# Patient Record
Sex: FEMALE | Race: WHITE | NOT HISPANIC OR LATINO | Employment: OTHER | URBAN - METROPOLITAN AREA
[De-identification: names, ages, dates, MRNs, and addresses within clinical notes are randomized per-mention and may not be internally consistent; named-entity substitution may affect disease eponyms.]

---

## 2023-04-08 ENCOUNTER — APPOINTMENT (OUTPATIENT)
Dept: RADIOLOGY | Facility: MEDICAL CENTER | Age: 88
DRG: 087 | End: 2023-04-08
Attending: EMERGENCY MEDICINE
Payer: MEDICARE

## 2023-04-08 ENCOUNTER — APPOINTMENT (OUTPATIENT)
Dept: RADIOLOGY | Facility: MEDICAL CENTER | Age: 88
DRG: 087 | End: 2023-04-08
Attending: SURGERY
Payer: MEDICARE

## 2023-04-08 ENCOUNTER — HOSPITAL ENCOUNTER (INPATIENT)
Facility: MEDICAL CENTER | Age: 88
LOS: 3 days | DRG: 087 | End: 2023-04-11
Attending: EMERGENCY MEDICINE | Admitting: SURGERY
Payer: MEDICARE

## 2023-04-08 ENCOUNTER — HOSPITAL ENCOUNTER (OUTPATIENT)
Dept: RADIOLOGY | Facility: MEDICAL CENTER | Age: 88
End: 2023-04-08

## 2023-04-08 DIAGNOSIS — R41.82 ALTERED MENTAL STATUS, UNSPECIFIED ALTERED MENTAL STATUS TYPE: ICD-10-CM

## 2023-04-08 DIAGNOSIS — S01.01XA LACERATION OF SCALP, INITIAL ENCOUNTER: ICD-10-CM
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
## 2023-04-08 DIAGNOSIS — S06.5XAA SDH (SUBDURAL HEMATOMA) (HCC): ICD-10-CM

## 2023-04-08 PROBLEM — S02.119A FRACTURE OF OCCIPITAL BONE (HCC): Status: ACTIVE | Noted: 2023-04-08

## 2023-04-08 PROBLEM — Z86.73 HISTORY OF EMBOLIC STROKE: Status: ACTIVE | Noted: 2023-04-08

## 2023-04-08 PROBLEM — I10 PRIMARY HYPERTENSION: Status: ACTIVE | Noted: 2023-04-08

## 2023-04-08 PROBLEM — Z53.09 CONTRAINDICATION TO ANTICOAGULATION THERAPY: Status: ACTIVE | Noted: 2023-04-08

## 2023-04-08 PROBLEM — S01.91XA LACERATION OF HEAD, INITIAL ENCOUNTER: Status: ACTIVE | Noted: 2023-04-08

## 2023-04-08 PROBLEM — T14.90XA TRAUMA: Status: ACTIVE | Noted: 2023-04-08

## 2023-04-08 PROBLEM — S02.119B: Status: ACTIVE | Noted: 2023-04-08

## 2023-04-08 PROBLEM — M19.90 OSTEOARTHRITIS: Status: ACTIVE | Noted: 2023-04-08

## 2023-04-08 LAB
ABO + RH BLD: NORMAL
ABO GROUP BLD: NORMAL
ALBUMIN SERPL BCP-MCNC: 3.7 G/DL (ref 3.2–4.9)
ALBUMIN/GLOB SERPL: 1.3 G/DL
ALP SERPL-CCNC: 110 U/L (ref 30–99)
ALT SERPL-CCNC: 7 U/L (ref 2–50)
ANION GAP SERPL CALC-SCNC: 11 MMOL/L (ref 7–16)
APPEARANCE UR: CLEAR
APTT PPP: 29.2 SEC (ref 24.7–36)
AST SERPL-CCNC: 22 U/L (ref 12–45)
BARCODED ABORH UBTYP: 7300
BARCODED PRD CODE UBPRD: NORMAL
BARCODED UNIT NUM UBUNT: NORMAL
BILIRUB SERPL-MCNC: 0.4 MG/DL (ref 0.1–1.5)
BILIRUB UR QL STRIP.AUTO: NEGATIVE
BLD GP AB SCN SERPL QL: NORMAL
BUN SERPL-MCNC: 14 MG/DL (ref 8–22)
CALCIUM ALBUM COR SERPL-MCNC: 8.6 MG/DL (ref 8.5–10.5)
CALCIUM SERPL-MCNC: 8.4 MG/DL (ref 8.5–10.5)
CFT BLD TEG: 5.3 MIN (ref 4.6–9.1)
CFT BLD TEG: 5.9 MIN (ref 4.6–9.1)
CFT P HPASE BLD TEG: 5 MIN (ref 4.3–8.3)
CFT P HPASE BLD TEG: 5.6 MIN (ref 4.3–8.3)
CHLORIDE SERPL-SCNC: 109 MMOL/L (ref 96–112)
CLOT ANGLE BLD TEG: 74.5 DEGREES (ref 63–78)
CLOT ANGLE BLD TEG: 76.9 DEGREES (ref 63–78)
CLOT LYSIS 30M P MA LENFR BLD TEG: 0.7 % (ref 0–2.6)
CLOT LYSIS 30M P MA LENFR BLD TEG: 1 % (ref 0–2.6)
CO2 SERPL-SCNC: 23 MMOL/L (ref 20–33)
COLOR UR: YELLOW
COMPONENT P 8504P: NORMAL
CREAT SERPL-MCNC: 0.77 MG/DL (ref 0.5–1.4)
CT.EXTRINSIC BLD ROTEM: 1 MIN (ref 0.8–2.1)
CT.EXTRINSIC BLD ROTEM: 1.1 MIN (ref 0.8–2.1)
ERYTHROCYTE [DISTWIDTH] IN BLOOD BY AUTOMATED COUNT: 48.2 FL (ref 35.9–50)
ETHANOL BLD-MCNC: <10.1 MG/DL
GFR SERPLBLD CREATININE-BSD FMLA CKD-EPI: 75 ML/MIN/1.73 M 2
GLOBULIN SER CALC-MCNC: 2.8 G/DL (ref 1.9–3.5)
GLUCOSE SERPL-MCNC: 100 MG/DL (ref 65–99)
GLUCOSE UR STRIP.AUTO-MCNC: NEGATIVE MG/DL
HCT VFR BLD AUTO: 37.9 % (ref 37–47)
HGB BLD-MCNC: 12.1 G/DL (ref 12–16)
INR PPP: 1.06 (ref 0.87–1.13)
KETONES UR STRIP.AUTO-MCNC: NEGATIVE MG/DL
LEUKOCYTE ESTERASE UR QL STRIP.AUTO: NEGATIVE
MCF BLD TEG: 63.1 MM (ref 52–69)
MCF BLD TEG: 63.9 MM (ref 52–69)
MCF.PLATELET INHIB BLD ROTEM: 21.1 MM (ref 15–32)
MCF.PLATELET INHIB BLD ROTEM: 24.4 MM (ref 15–32)
MCH RBC QN AUTO: 28.3 PG (ref 27–33)
MCHC RBC AUTO-ENTMCNC: 31.9 G/DL (ref 33.6–35)
MCV RBC AUTO: 88.6 FL (ref 81.4–97.8)
MICRO URNS: NORMAL
NITRITE UR QL STRIP.AUTO: NEGATIVE
PA AA BLD-ACNC: 20.4 % (ref 0–11)
PA AA BLD-ACNC: 86.2 % (ref 0–11)
PA ADP BLD-ACNC: 15.7 % (ref 0–17)
PA ADP BLD-ACNC: 16.4 % (ref 0–17)
PH UR STRIP.AUTO: 7 [PH] (ref 5–8)
PLATELET # BLD AUTO: 242 K/UL (ref 164–446)
PMV BLD AUTO: 10.9 FL (ref 9–12.9)
POTASSIUM SERPL-SCNC: 4.5 MMOL/L (ref 3.6–5.5)
PRODUCT TYPE UPROD: NORMAL
PROT SERPL-MCNC: 6.5 G/DL (ref 6–8.2)
PROT UR QL STRIP: NEGATIVE MG/DL
PROTHROMBIN TIME: 13.7 SEC (ref 12–14.6)
RBC # BLD AUTO: 4.28 M/UL (ref 4.2–5.4)
RBC UR QL AUTO: NEGATIVE
RH BLD: NORMAL
SODIUM SERPL-SCNC: 143 MMOL/L (ref 135–145)
SP GR UR STRIP.AUTO: 1.01
TEG ALGORITHM TGALG: ABNORMAL
TEG ALGORITHM TGALG: ABNORMAL
UNIT STATUS USTAT: NORMAL
UROBILINOGEN UR STRIP.AUTO-MCNC: 0.2 MG/DL
WBC # BLD AUTO: 8.5 K/UL (ref 4.8–10.8)

## 2023-04-08 PROCEDURE — 36415 COLL VENOUS BLD VENIPUNCTURE: CPT

## 2023-04-08 PROCEDURE — 90715 TDAP VACCINE 7 YRS/> IM: CPT | Performed by: EMERGENCY MEDICINE

## 2023-04-08 PROCEDURE — 51701 INSERT BLADDER CATHETER: CPT

## 2023-04-08 PROCEDURE — 71045 X-RAY EXAM CHEST 1 VIEW: CPT

## 2023-04-08 PROCEDURE — 85576 BLOOD PLATELET AGGREGATION: CPT | Mod: 91

## 2023-04-08 PROCEDURE — 81003 URINALYSIS AUTO W/O SCOPE: CPT

## 2023-04-08 PROCEDURE — 99291 CRITICAL CARE FIRST HOUR: CPT

## 2023-04-08 PROCEDURE — 700111 HCHG RX REV CODE 636 W/ 250 OVERRIDE (IP): Performed by: SURGERY

## 2023-04-08 PROCEDURE — G0390 TRAUMA RESPONS W/HOSP CRITI: HCPCS

## 2023-04-08 PROCEDURE — A9270 NON-COVERED ITEM OR SERVICE: HCPCS | Performed by: SURGERY

## 2023-04-08 PROCEDURE — 85347 COAGULATION TIME ACTIVATED: CPT

## 2023-04-08 PROCEDURE — P9034 PLATELETS, PHERESIS: HCPCS

## 2023-04-08 PROCEDURE — 700111 HCHG RX REV CODE 636 W/ 250 OVERRIDE (IP): Performed by: EMERGENCY MEDICINE

## 2023-04-08 PROCEDURE — 70450 CT HEAD/BRAIN W/O DYE: CPT

## 2023-04-08 PROCEDURE — 82077 ASSAY SPEC XCP UR&BREATH IA: CPT

## 2023-04-08 PROCEDURE — 85730 THROMBOPLASTIN TIME PARTIAL: CPT

## 2023-04-08 PROCEDURE — 3E0234Z INTRODUCTION OF SERUM, TOXOID AND VACCINE INTO MUSCLE, PERCUTANEOUS APPROACH: ICD-10-PCS | Performed by: EMERGENCY MEDICINE

## 2023-04-08 PROCEDURE — 36430 TRANSFUSION BLD/BLD COMPNT: CPT

## 2023-04-08 PROCEDURE — 85027 COMPLETE CBC AUTOMATED: CPT

## 2023-04-08 PROCEDURE — 72170 X-RAY EXAM OF PELVIS: CPT

## 2023-04-08 PROCEDURE — 86900 BLOOD TYPING SEROLOGIC ABO: CPT

## 2023-04-08 PROCEDURE — 770022 HCHG ROOM/CARE - ICU (200)

## 2023-04-08 PROCEDURE — 90471 IMMUNIZATION ADMIN: CPT

## 2023-04-08 PROCEDURE — 86850 RBC ANTIBODY SCREEN: CPT

## 2023-04-08 PROCEDURE — 700102 HCHG RX REV CODE 250 W/ 637 OVERRIDE(OP): Performed by: SURGERY

## 2023-04-08 PROCEDURE — 85384 FIBRINOGEN ACTIVITY: CPT

## 2023-04-08 PROCEDURE — 80053 COMPREHEN METABOLIC PANEL: CPT

## 2023-04-08 PROCEDURE — 30233R1 TRANSFUSION OF NONAUTOLOGOUS PLATELETS INTO PERIPHERAL VEIN, PERCUTANEOUS APPROACH: ICD-10-PCS | Performed by: SURGERY

## 2023-04-08 PROCEDURE — 85610 PROTHROMBIN TIME: CPT

## 2023-04-08 PROCEDURE — 86901 BLOOD TYPING SEROLOGIC RH(D): CPT

## 2023-04-08 PROCEDURE — 99285 EMERGENCY DEPT VISIT HI MDM: CPT

## 2023-04-08 PROCEDURE — 99223 1ST HOSP IP/OBS HIGH 75: CPT | Performed by: SURGERY

## 2023-04-08 RX ORDER — ENEMA 19; 7 G/133ML; G/133ML
1 ENEMA RECTAL
Status: DISCONTINUED | OUTPATIENT
Start: 2023-04-08 | End: 2023-04-11 | Stop reason: HOSPADM

## 2023-04-08 RX ORDER — OXYCODONE HYDROCHLORIDE 5 MG/1
5 TABLET ORAL
Status: DISCONTINUED | OUTPATIENT
Start: 2023-04-08 | End: 2023-04-08

## 2023-04-08 RX ORDER — ACETAMINOPHEN 500 MG
1000 TABLET ORAL EVERY 6 HOURS
Status: DISCONTINUED | OUTPATIENT
Start: 2023-04-08 | End: 2023-04-11 | Stop reason: HOSPADM

## 2023-04-08 RX ORDER — AMLODIPINE BESYLATE 5 MG/1
5 TABLET ORAL DAILY
Status: DISCONTINUED | OUTPATIENT
Start: 2023-04-08 | End: 2023-04-11 | Stop reason: HOSPADM

## 2023-04-08 RX ORDER — BISACODYL 10 MG
10 SUPPOSITORY, RECTAL RECTAL
Status: DISCONTINUED | OUTPATIENT
Start: 2023-04-08 | End: 2023-04-11 | Stop reason: HOSPADM

## 2023-04-08 RX ORDER — LEVETIRACETAM 500 MG/5ML
500 INJECTION, SOLUTION, CONCENTRATE INTRAVENOUS EVERY 12 HOURS
Status: DISCONTINUED | OUTPATIENT
Start: 2023-04-08 | End: 2023-04-11 | Stop reason: ALTCHOICE

## 2023-04-08 RX ORDER — PANTOPRAZOLE SODIUM 40 MG/1
40 TABLET, DELAYED RELEASE ORAL DAILY
Status: DISCONTINUED | OUTPATIENT
Start: 2023-04-08 | End: 2023-04-08

## 2023-04-08 RX ORDER — PINDOLOL 5 MG/1
10 TABLET ORAL 2 TIMES DAILY
Status: DISCONTINUED | OUTPATIENT
Start: 2023-04-08 | End: 2023-04-11 | Stop reason: HOSPADM

## 2023-04-08 RX ORDER — HYDROCODONE BITARTRATE AND ACETAMINOPHEN 5; 325 MG/1; MG/1
1 TABLET ORAL EVERY 6 HOURS PRN
Status: DISCONTINUED | OUTPATIENT
Start: 2023-04-08 | End: 2023-04-11 | Stop reason: HOSPADM

## 2023-04-08 RX ORDER — ACETAMINOPHEN 500 MG
500 TABLET ORAL EVERY 4 HOURS PRN
Status: DISCONTINUED | OUTPATIENT
Start: 2023-04-08 | End: 2023-04-09

## 2023-04-08 RX ORDER — HYDROMORPHONE HYDROCHLORIDE 1 MG/ML
0.5 INJECTION, SOLUTION INTRAMUSCULAR; INTRAVENOUS; SUBCUTANEOUS
Status: DISCONTINUED | OUTPATIENT
Start: 2023-04-08 | End: 2023-04-09

## 2023-04-08 RX ORDER — LEVETIRACETAM 500 MG/1
500 TABLET ORAL EVERY 12 HOURS
Status: DISCONTINUED | OUTPATIENT
Start: 2023-04-08 | End: 2023-04-11 | Stop reason: HOSPADM

## 2023-04-08 RX ORDER — AMOXICILLIN 250 MG
1 CAPSULE ORAL NIGHTLY
Status: DISCONTINUED | OUTPATIENT
Start: 2023-04-08 | End: 2023-04-11 | Stop reason: HOSPADM

## 2023-04-08 RX ORDER — ACETAMINOPHEN 500 MG
1000 TABLET ORAL EVERY 6 HOURS PRN
Status: DISCONTINUED | OUTPATIENT
Start: 2023-04-13 | End: 2023-04-11 | Stop reason: HOSPADM

## 2023-04-08 RX ORDER — PANTOPRAZOLE SODIUM 40 MG/1
40 TABLET, DELAYED RELEASE ORAL DAILY
COMMUNITY

## 2023-04-08 RX ORDER — METOCLOPRAMIDE 10 MG/1
10 TABLET ORAL 2 TIMES DAILY
COMMUNITY

## 2023-04-08 RX ORDER — OXYCODONE HYDROCHLORIDE 10 MG/1
10 TABLET ORAL
Status: DISCONTINUED | OUTPATIENT
Start: 2023-04-08 | End: 2023-04-08

## 2023-04-08 RX ORDER — PAROXETINE HYDROCHLORIDE 20 MG/1
40 TABLET, FILM COATED ORAL EVERY MORNING
Status: DISCONTINUED | OUTPATIENT
Start: 2023-04-09 | End: 2023-04-11 | Stop reason: HOSPADM

## 2023-04-08 RX ORDER — AMOXICILLIN 250 MG
1 CAPSULE ORAL
Status: DISCONTINUED | OUTPATIENT
Start: 2023-04-08 | End: 2023-04-11 | Stop reason: HOSPADM

## 2023-04-08 RX ORDER — GABAPENTIN 400 MG/1
400 CAPSULE ORAL 2 TIMES DAILY
Status: DISCONTINUED | OUTPATIENT
Start: 2023-04-08 | End: 2023-04-11 | Stop reason: HOSPADM

## 2023-04-08 RX ORDER — HYDRALAZINE HYDROCHLORIDE 20 MG/ML
10 INJECTION INTRAMUSCULAR; INTRAVENOUS EVERY 4 HOURS PRN
Status: DISCONTINUED | OUTPATIENT
Start: 2023-04-08 | End: 2023-04-11 | Stop reason: HOSPADM

## 2023-04-08 RX ORDER — POLYETHYLENE GLYCOL 3350 17 G/17G
1 POWDER, FOR SOLUTION ORAL 2 TIMES DAILY
Status: DISCONTINUED | OUTPATIENT
Start: 2023-04-08 | End: 2023-04-11 | Stop reason: HOSPADM

## 2023-04-08 RX ORDER — DOCUSATE SODIUM 100 MG/1
100 CAPSULE, LIQUID FILLED ORAL 2 TIMES DAILY
Status: DISCONTINUED | OUTPATIENT
Start: 2023-04-08 | End: 2023-04-11 | Stop reason: HOSPADM

## 2023-04-08 RX ORDER — OMEPRAZOLE 20 MG/1
20 CAPSULE, DELAYED RELEASE ORAL DAILY
Status: DISCONTINUED | OUTPATIENT
Start: 2023-04-09 | End: 2023-04-11 | Stop reason: HOSPADM

## 2023-04-08 RX ORDER — ONDANSETRON 4 MG/1
4 TABLET, ORALLY DISINTEGRATING ORAL EVERY 4 HOURS PRN
Status: DISCONTINUED | OUTPATIENT
Start: 2023-04-08 | End: 2023-04-11 | Stop reason: HOSPADM

## 2023-04-08 RX ORDER — ONDANSETRON 2 MG/ML
4 INJECTION INTRAMUSCULAR; INTRAVENOUS EVERY 4 HOURS PRN
Status: DISCONTINUED | OUTPATIENT
Start: 2023-04-08 | End: 2023-04-09

## 2023-04-08 RX ADMIN — GABAPENTIN 400 MG: 400 CAPSULE ORAL at 17:56

## 2023-04-08 RX ADMIN — PINDOLOL 10 MG: 5 TABLET ORAL at 17:49

## 2023-04-08 RX ADMIN — LEVETIRACETAM 500 MG: 500 TABLET, FILM COATED ORAL at 17:55

## 2023-04-08 RX ADMIN — AMLODIPINE BESYLATE 5 MG: 10 TABLET ORAL at 17:49

## 2023-04-08 RX ADMIN — ACETAMINOPHEN 1000 MG: 500 TABLET, FILM COATED ORAL at 13:51

## 2023-04-08 RX ADMIN — HYDRALAZINE HYDROCHLORIDE 10 MG: 20 INJECTION INTRAMUSCULAR; INTRAVENOUS at 13:51

## 2023-04-08 RX ADMIN — ACETAMINOPHEN 1000 MG: 500 TABLET, FILM COATED ORAL at 23:53

## 2023-04-08 RX ADMIN — ACETAMINOPHEN 1000 MG: 500 TABLET, FILM COATED ORAL at 17:55

## 2023-04-08 RX ADMIN — CLOSTRIDIUM TETANI TOXOID ANTIGEN (FORMALDEHYDE INACTIVATED), CORYNEBACTERIUM DIPHTHERIAE TOXOID ANTIGEN (FORMALDEHYDE INACTIVATED), BORDETELLA PERTUSSIS TOXOID ANTIGEN (GLUTARALDEHYDE INACTIVATED), BORDETELLA PERTUSSIS FILAMENTOUS HEMAGGLUTININ ANTIGEN (FORMALDEHYDE INACTIVATED), BORDETELLA PERTUSSIS PERTACTIN ANTIGEN, AND BORDETELLA PERTUSSIS FIMBRIAE 2/3 ANTIGEN 0.5 ML: 5; 2; 2.5; 5; 3; 5 INJECTION, SUSPENSION INTRAMUSCULAR at 10:55

## 2023-04-08 ASSESSMENT — PATIENT HEALTH QUESTIONNAIRE - PHQ9
SUM OF ALL RESPONSES TO PHQ9 QUESTIONS 1 AND 2: 0
1. LITTLE INTEREST OR PLEASURE IN DOING THINGS: NOT AT ALL
2. FEELING DOWN, DEPRESSED, IRRITABLE, OR HOPELESS: NOT AT ALL

## 2023-04-08 ASSESSMENT — LIFESTYLE VARIABLES
EVER HAD A DRINK FIRST THING IN THE MORNING TO STEADY YOUR NERVES TO GET RID OF A HANGOVER: NO
ON A TYPICAL DAY WHEN YOU DRINK ALCOHOL HOW MANY DRINKS DO YOU HAVE: 0
DOES PATIENT WANT TO STOP DRINKING: NO
HAVE YOU EVER FELT YOU SHOULD CUT DOWN ON YOUR DRINKING: NO
HOW MANY TIMES IN THE PAST YEAR HAVE YOU HAD 5 OR MORE DRINKS IN A DAY: 0
CONSUMPTION TOTAL: NEGATIVE
TOTAL SCORE: 0
AVERAGE NUMBER OF DAYS PER WEEK YOU HAVE A DRINK CONTAINING ALCOHOL: 0
HAVE PEOPLE ANNOYED YOU BY CRITICIZING YOUR DRINKING: NO
EVER FELT BAD OR GUILTY ABOUT YOUR DRINKING: NO
TOTAL SCORE: 0
TOTAL SCORE: 0
ALCOHOL_USE: NO

## 2023-04-08 ASSESSMENT — PAIN DESCRIPTION - PAIN TYPE
TYPE: ACUTE PAIN
TYPE: ACUTE PAIN

## 2023-04-08 ASSESSMENT — FIBROSIS 4 INDEX: FIB4 SCORE: 2.92

## 2023-04-08 NOTE — ED TRIAGE NOTES
"Chief Complaint   Patient presents with    Trauma Green     Pt transferred from [facility] as subdural bleed, hit head this AM s/p GLF at home, unknown LOC, pt takes aspirin but denies other thinners, confused on scene but alert/oriented at this facility, occipital head lac repaired at [place] with staples, no neuro deficits on arrival     Head Injury     Pt BIB EMS as transfer from [place] in [facility] for above complaints, VSS on RA, GCS 15, NAD.    BP (!) 164/85   Pulse 63   Temp 36.2 °C (97.2 °F)   Resp 16   Ht 1.575 m (5' 2\")   Wt 45.4 kg (100 lb)   SpO2 93%   BMI 18.29 kg/m²     "

## 2023-04-08 NOTE — PROGRESS NOTES
Arrived to T914  Patient able to move self over to bed  Patient AAOx4, denies pain  Vital signs:   /87  HR 70  Pulse ox 94% on room air  Clear liquid snacks/drinks provided per order and patient request  CHG bath performed    Belongings:  Patient would prefer to leave jewelry on until family arrives and then she will give the jewelry to them:  2 gold colored rings  Gold colored watch   Plaid pants  I-phone      4 Eyes Skin Assessment Completed by DAYA Parham and DAYA Brown.    Head Scab/ laceration with staples  Ears WDL  Nose WDL  Mouth WDL  Neck WDL  Breast/Chest WDL  Shoulder Blades WDL  Spine WDL  (R) Arm/Elbow/Hand WDL  (L) Arm/Elbow/Hand WDL  Abdomen WDL  Groin WDL  Scrotum/Coccyx/Buttocks WDL  (R) Leg WDL  (L) Leg WDL  (R) Heel/Foot/Toe WDL  (L) Heel/Foot/Toe WDL      Devices In Places Tele Box, Blood Pressure Cuff, Pulse Ox, and SCD's Purewick      Interventions In Place Sacral Mepilex, Pillows, and Q2 Turns    Possible Skin Injury No    Pictures Uploaded Into Epic N/A  Wound Consult Placed N/A  RN Wound Prevention Protocol Ordered Yes

## 2023-04-08 NOTE — ASSESSMENT & PLAN NOTE
Chronic condition treated with gabapentin and Norco.  Resumed gabapentin on admission.  Multimodal pain control.

## 2023-04-08 NOTE — H&P
TRAUMA HISTORY AND PHYSICAL    CHIEF COMPLAINT: Intracranial hemorrhage.    HISTORY OF PRESENT ILLNESS: The patient is a 87 year old  fell yesterday.  She was evaluated at [facility] and transferred here for SDH.  An occipital laceration was repaired at [place].  The patient was triaged as a consult activation.  Neurologic status remained stable.  The patient is on aspirin and has 80% platelet inhibition.  She received a unit of platelets for this.  Now admitted to the unit for serial neurochecks and head CT.      PAST MEDICAL HISTORY:  has a past medical history of Depression, Hypertension, Insomnia, Subdural hematoma (HCC), and TIA (transient ischemic attack).     PAST SURGICAL HISTORY: patient denies any surgical history     ALLERGIES: No Known Allergies     CURRENT MEDICATIONS:   Home Medications       Reviewed by Anatoliy James Student (Student Nurse) on 04/08/23 at 1210  Med List Status: Complete     Medication Last Dose Status   amLODIPine (NORVASC) 5 MG Tab UNKN Active   aspirin 81 MG EC tablet UNKN Active   gabapentin (NEURONTIN) 400 MG Cap UNKN Active   HYDROcodone/acetaminophen (NORCO)  MG Tab UNKN Active   metoclopramide (REGLAN) 10 MG Tab UNKN Active   pantoprazole (PROTONIX) 40 MG Tablet Delayed Response UNKN Active   paroxetine (PAXIL) 40 MG tablet UNKN Active   pindolol (VISKIN) 10 MG Tab UNKN Active   temazepam (RESTORIL) 15 MG Cap UNKN Active                    FAMILY HISTORY: History reviewed. No pertinent family history.     SOCIAL HISTORY:   Social History     Tobacco Use    Smoking status: Never    Smokeless tobacco: Never   Vaping Use    Vaping Use: Never used   Substance and Sexual Activity    Alcohol use: Not Currently    Drug use: Not Currently    Sexual activity: Not on file       REVIEW OF SYSTEMS: Comprehensive review of systems is negative with the   exception of the aforementioned HPI, PMH, and PSH elements in accordance with CMS guidelines.     PHYSICAL EXAMINATION:  "    GENERAL: No distress  VITAL SIGNS: BP (!) 152/72   Pulse 69   Temp 37.8 °C (100 °F)   Resp 20   Ht 1.575 m (5' 2\")   Wt 47.1 kg (103 lb 13.4 oz)   SpO2 93%   HEAD AND NECK: Pupils:  Equal and Reactive  Dentition: Occlusion is adequate  Facial:  Symmetrical.    NECK: No JVD. Trachea midline. Cervical tenderness is  absent   CHEST: Breath sounds are equal. No sternal tenderness.  No  lateral rib tenderness.  CARDIOVASCULAR: Regular rhythm  ABDOMEN: Soft, no tenderness guarding or peritoneal findings.   PELVIS: Stable.  EXTREMITIES: Examination of the upper and lower extremities : No gross long bone or joint deformity.    BACK: No midline stepoffs.  No Tenderness  NEUROLOGIC: [place] Coma Score is 15  .  No gross motor or sensory deficit.     LABORATORY VALUES:   Recent Labs     04/08/23  1028   WBC 8.5   RBC 4.28   HEMOGLOBIN 12.1   HEMATOCRIT 37.9   MCV 88.6   MCH 28.3   MCHC 31.9*   RDW 48.2   PLATELETCT 242   MPV 10.9     Recent Labs     04/08/23  1028   SODIUM 143   POTASSIUM 4.5   CHLORIDE 109   CO2 23   GLUCOSE 100*   BUN 14   CREATININE 0.77   CALCIUM 8.4*     Recent Labs     04/08/23  1028   ASTSGOT 22   ALTSGPT 7   TBILIRUBIN 0.4   ALKPHOSPHAT 110*   GLOBULIN 2.8   INR 1.06     Recent Labs     04/08/23  1028   APTT 29.2   INR 1.06        IMAGING:   CT-HEAD W/O   Final Result   Addendum (preliminary) 1 of 1   These findings were discussed with MASON KRUEGER on 4/8/2023 3:26 PM.      Final      1.  Interval increase in size of RIGHT frontal subdural hematoma now crossing the midline into the LEFT inferior frontal region measuring up to 8 mm thickness and into RIGHT anterior temporal region where it measures 7 mm in thickness.   2.  No significant midline shift.   3.  Nondepressed RIGHT occipital skull fracture.                  OUTSIDE IMAGES-CT CERVICAL SPINE   Final Result      DX-PELVIS-1 OR 2 VIEWS   Final Result      1.  Probable healed fracture RIGHT inferior pubic ramus.   2.  No definite acute " pelvic fracture.   3.  Moderate degenerative change of both hips.      DX-CHEST-LIMITED (1 VIEW)   Final Result      No acute cardiopulmonary disease.      OUTSIDE IMAGES-CT HEAD   Final Result          IMPRESSION AND PLAN:  SDH (subdural hematoma) (HCC)  Acute subdural hemorrhage along the anterior right frontal lobe, 5mm. -- mBIG3.  Non-operative management.  Post traumatic pharmacologic seizure prophylaxis for 1 week.  Speech Language Pathology cognitive evaluation.  Neurosurgery consulted by ERP.  Bakari Erazo MD. Neurosurgeon. [facility].    Laceration of head, initial encounter  Posterior head laceration. Closed with staples prior to arrival.   Staple removal in 7 post repair (4/15).  Tetanus in ED.     Contraindication to anticoagulation therapy  Prophylactic anticoagulation for thrombotic prevention initially contraindicated secondary to elevated bleeding risk.  4/10 Trauma surveillance venous duplex scanning ordered.    History of embolic stroke  Chronic condition treated with ASA.   No baseline deficits.  Holding maintenance medication during acute traumatic illness.    Trauma  Mechanical GLF. GCS 15. On ASA.  Trauma Green Transfer Activation from [facility] in [place], [place].  Pilo James MD. Trauma Surgery.    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  AA inhibition 86.2.  - 1u Platelets.  - Repeat TEG.  - Hold ASA.    Depression  Chronic condition treated with paroxetine.  Resumed maintenance medication on admission.    Osteoarthritis  Chronic condition treated with gabapentin and Norco.  Resumed gabapentin on admission.  Multimodal pain control.     Primary hypertension  Chronic condition treated with amlodipine and pindolol.  Resumed maintenance medication on admission.    Insomnia  Chronic condition treated with temazepam.  Confused on arrival.  Holding maintenance medication during acute traumatic illness.    Open fracture  of right side of occipital bone (HCC)  Nondepressed RIGHT occipital skull fracture.  Serial neuro exams.   Multimodal pain control.       ____________________________________   Pilo James MD, FACS      DD: 4/8/2023   DT: 11:10 AM

## 2023-04-08 NOTE — ASSESSMENT & PLAN NOTE
Chronic condition treated with ASA.  No baseline deficits.  Holding maintenance medication during acute traumatic illness.

## 2023-04-08 NOTE — ED PROVIDER NOTES
ED Provider Note    CHIEF COMPLAINT  No chief complaint on file.  Altered mental status subdural hematoma.    EXTERNAL RECORDS REVIEWED  Reviewed physicians notes from the transferring hospital    HPI/ROS  LIMITATION TO HISTORY   Select: Altered mental status / Confusion  OUTSIDE HISTORIAN(S):  History obtained from the paramedics who brought the patient to the emergency department.  I also spoke with the transferring physician from the transferring hospital.    Yelena Barba is a 87 y.o. female who presents to the emergency department for evaluation of a subdural hematoma.  History was obtained primarily from the paramedics who brought the patient in and the physician at the hospital who transferred the patient here.    The patient apparently was a little bit off her baseline last night maybe a little bit confused she went to the restroom and came back and fell while getting back into bed.  She hit the back of her head.  She sustained an occipital laceration which was closed at the transferring hospital.  Neck CT was also performed and was negative.  She takes aspirin.  Unclear if her tetanus shot was updated.  Patient denies any complaints of pain or difficulty movement at this time.  Denies any other acute concerns or complaints.    She is limited because of her decreased mental status.          PAST MEDICAL HISTORY   has a past medical history of Depression, Hypertension, Insomnia, Subdural hematoma (HCC), and TIA (transient ischemic attack).    SURGICAL HISTORY  patient denies any surgical history    FAMILY HISTORY  No family history on file.    SOCIAL HISTORY  Social History     Tobacco Use    Smoking status: Never    Smokeless tobacco: Never   Vaping Use    Vaping Use: Never used   Substance and Sexual Activity    Alcohol use: Not Currently    Drug use: Not Currently    Sexual activity: Not on file       CURRENT MEDICATIONS  Home Medications    **Home medications have not yet been reviewed for this  "encounter**         ALLERGIES  No Known Allergies    PHYSICAL EXAM  VITAL SIGNS: BP (!) 166/88   Temp 36.2 °C (97.2 °F)   Ht 1.575 m (5' 2\")   Wt 45.4 kg (100 lb)   BMI 18.29 kg/m²    Constitutional: Well developed, Well nourished, No acute distress, Non-toxic appearance.   HENT: Normocephalic, 5 cm right occipital lack which is closed.  Bilateral external ears normal, Oropharynx moist, No oral exudates, Nose normal.   Eyes: PERRL, EOMI, Conjunctiva normal, No discharge.   Neck: Normal range of motion, No tenderness,  Cardiovascular: Normal heart rate, Normal rhythm, No murmurs, No rubs, No gallops.   Thorax & Lungs: Normal breath sounds, No respiratory distress, No wheezing, No chest tenderness.  No signs of trauma  Abdomen: Bowel sounds normal, Soft, No tenderness  Skin: Warm, Dry, No erythema, No rash.   Back: No tenderness, No CVA tenderness.  No signs of trauma  Musculoskeletal: Good range of motion in all major joints. No edema.   Neurologic: Alert, No focal deficits noted.         DIAGNOSTIC STUDIES / PROCEDURES    Results for orders placed or performed during the hospital encounter of 04/08/23   DIAGNOSTIC ALCOHOL   Result Value Ref Range    Diagnostic Alcohol <10.1 <10.1 mg/dL   CBC WITHOUT DIFFERENTIAL   Result Value Ref Range    WBC 8.5 4.8 - 10.8 K/uL    RBC 4.28 4.20 - 5.40 M/uL    Hemoglobin 12.1 12.0 - 16.0 g/dL    Hematocrit 37.9 37.0 - 47.0 %    MCV 88.6 81.4 - 97.8 fL    MCH 28.3 27.0 - 33.0 pg    MCHC 31.9 (L) 33.6 - 35.0 g/dL    RDW 48.2 35.9 - 50.0 fL    Platelet Count 242 164 - 446 K/uL    MPV 10.9 9.0 - 12.9 fL   Comp Metabolic Panel   Result Value Ref Range    Sodium 143 135 - 145 mmol/L    Potassium 4.5 3.6 - 5.5 mmol/L    Chloride 109 96 - 112 mmol/L    Co2 23 20 - 33 mmol/L    Anion Gap 11.0 7.0 - 16.0    Glucose 100 (H) 65 - 99 mg/dL    Bun 14 8 - 22 mg/dL    Creatinine 0.77 0.50 - 1.40 mg/dL    Calcium 8.4 (L) 8.5 - 10.5 mg/dL    AST(SGOT) 22 12 - 45 U/L    ALT(SGPT) 7 2 - 50 U/L    " Alkaline Phosphatase 110 (H) 30 - 99 U/L    Albumin 3.7 3.2 - 4.9 g/dL    Total Protein 6.5 6.0 - 8.2 g/dL    Globulin 2.8 1.9 - 3.5 g/dL    A-G Ratio 1.3 g/dL   Prothrombin Time   Result Value Ref Range    PT 13.7 12.0 - 14.6 sec    INR 1.06 0.87 - 1.13   APTT   Result Value Ref Range    APTT 29.2 24.7 - 36.0 sec   CORRECTED CALCIUM   Result Value Ref Range    Correct Calcium 8.6 8.5 - 10.5 mg/dL   ESTIMATED GFR   Result Value Ref Range    GFR (CKD-EPI) 75 >60 mL/min/1.73 m 2      RADIOLOGY  I have independently interpreted the diagnostic imaging associated with this visit and am waiting the final reading from the radiologist.   My preliminary interpretation is as follows: No intrathoracic pathology identified in the trauma room.  Radiologist interpretation: =    OUTSIDE IMAGES-CT CERVICAL SPINE   Final Result      DX-PELVIS-1 OR 2 VIEWS   Final Result      1.  Probable healed fracture RIGHT inferior pubic ramus.   2.  No definite acute pelvic fracture.   3.  Moderate degenerative change of both hips.      DX-CHEST-LIMITED (1 VIEW)   Final Result      No acute cardiopulmonary disease.      OUTSIDE IMAGES-CT HEAD   Final Result            COURSE & MEDICAL DECISION MAKING    ED Observation Status? No; Patient does not meet criteria for ED Observation.  Patient was transferred here for subdural hematoma she will require hospitalization.    INITIAL ASSESSMENT, COURSE AND PLAN  Care Narrative:     Patient is an 87-year-old woman transferred here for ground-level fall and subdural hematoma.  She is on aspirin and she has a big to subdural hematoma by size.        Spoke with the transferring physician and the paramedics who brought the patient in.  Is patient was seen and examined upon arrival here to the trauma room.    The patient's paperwork and physicians notes were reviewed from the transferring hospital as well as I spoke with the physician prior to transfer.    She worked up with level 2 labs with a tag.  Because  of this question of a possible decrease in her mental status before the fall she wanted a urinalysis to evaluate for UTI.  Exam is nonfocal with no evidence of acute stroke.    I reviewed her images I discussed case with Dr. SALVADOR Erazo on-call for neurosurgery who will see the patient recommends trauma ICU admission.      Spoke with the trauma team, Dr. James.          DISPOSITION AND DISCUSSIONS  I have discussed management of the patient with the following physicians and CONOR's: Dr. Erazo trauma surgery, trauma surgeon Dr. James    Discussion of management with other QHP or appropriate source(s): Case discussed with trauma APRN's.        Decision tools and prescription drugs considered including, but not limited to: Big criteria for intracranial hemorrhage, Nexus criteria..    FINAL DIAGNOSIS  1. Fall, initial encounter    2. SDH (subdural hematoma) (HCC)    3. Laceration of scalp, initial encounter    4. Altered mental status, unspecified altered mental status type    5.  Critical care time 45 minutes.       Electronically signed by: Marty Layne M.D., 4/8/2023 10:32 AM

## 2023-04-08 NOTE — ASSESSMENT & PLAN NOTE
Chronic condition treated with temazepam.  Holding maintenance medication during acute traumatic illness.  4/9 Resumed restoril.

## 2023-04-08 NOTE — PROGRESS NOTES
Neurosurgery      Current film just completed shows bilateral frontal sdh/sah and some blood anteriorly to the right temporal lobe. Confirms basilar skull fracture.   Agree with transfer to Trauma ICU. Patient has been transfused a unit of platelets.

## 2023-04-08 NOTE — ED NOTES
Bedside report given to Kevin STAPLETON.  Pt transferred to floor via [facility] on monitor

## 2023-04-08 NOTE — ASSESSMENT & PLAN NOTE
Mechanical GLF. GCS 15. On ASA.  Trauma Green Transfer Activation from [facility] in [place], [place].  Pilo James MD. Trauma Surgery.

## 2023-04-08 NOTE — ED NOTES
Report received from Rohith STAPLETON. Pt placed on cardiac monitor. 20 Ga in LAC flushes and draws.     Pt is GCS 15. Neuro assessment unremarkable at this time.

## 2023-04-08 NOTE — ED NOTES
Pt arrives by Garcia Williamson as transfer from [place] in [facility] s/p GLF resulting in diagnosed SDH, pt has laceration to posterior occiput of head approx 5cm with staples placed at [place], bleeding controlled, A+O 4 on arrival, pt was reported to be confused this morning. Mechanism: Pt went to bathroom at home 06:00 today, then sat on bed and fell from bed, hit her head on unknown surface, unknown LOC, pt does take daily aspirin but denies other thinners. VSS on RA, pt received her home dose of a beta blocker at previous facility, GCS 15, NAD, +CMS, CHILDS.

## 2023-04-08 NOTE — ASSESSMENT & PLAN NOTE
Chronic condition treated with amlodipine and pindolol.  Resumed maintenance medication on admission.

## 2023-04-08 NOTE — ED NOTES
Med rec complete per patient at bedside and per patient home pharmacy.    Patient has NKDA.    No abx in the last 30 days.    Home pharmacy is [facility] in [place], [place].

## 2023-04-08 NOTE — ASSESSMENT & PLAN NOTE
Acute subdural hemorrhage along the anterior right frontal lobe, 5mm.  mBIG3.  Non-operative management.  Post traumatic pharmacologic seizure prophylaxis for 1 week.  Speech Language Pathology cognitive evaluation.  Bakari Erazo MD. Neurosurgeon. [facility].

## 2023-04-08 NOTE — ASSESSMENT & PLAN NOTE
Takes 81 mg ASA daily outpatient.  Admission AA inhibition 86.2%, transfused 1 unit platelet pheresis.  Repeat TEG - 22%.

## 2023-04-08 NOTE — ASSESSMENT & PLAN NOTE
Prophylactic anticoagulation for thrombotic prevention initially contraindicated secondary to elevated bleeding risk.  4/10 Trauma surveillance venous duplex scanning ordered.   Ambulate TID.

## 2023-04-08 NOTE — ASSESSMENT & PLAN NOTE
Posterior head laceration. Closed with staples prior to arrival.  Tetanus given in ED.  Staple removal in 7 post repair (4/15).

## 2023-04-09 PROBLEM — S02.5XXA CLOSED FRACTURE OF TOOTH: Status: ACTIVE | Noted: 2023-04-09

## 2023-04-09 LAB
ALBUMIN SERPL BCP-MCNC: 3.6 G/DL (ref 3.2–4.9)
ALBUMIN/GLOB SERPL: 1.4 G/DL
ALP SERPL-CCNC: 103 U/L (ref 30–99)
ALT SERPL-CCNC: 9 U/L (ref 2–50)
ANION GAP SERPL CALC-SCNC: 11 MMOL/L (ref 7–16)
AST SERPL-CCNC: 18 U/L (ref 12–45)
BASOPHILS # BLD AUTO: 0.6 % (ref 0–1.8)
BASOPHILS # BLD: 0.03 K/UL (ref 0–0.12)
BILIRUB SERPL-MCNC: 0.5 MG/DL (ref 0.1–1.5)
BUN SERPL-MCNC: 10 MG/DL (ref 8–22)
CALCIUM ALBUM COR SERPL-MCNC: 8.8 MG/DL (ref 8.5–10.5)
CALCIUM SERPL-MCNC: 8.5 MG/DL (ref 8.5–10.5)
CHLORIDE SERPL-SCNC: 111 MMOL/L (ref 96–112)
CO2 SERPL-SCNC: 22 MMOL/L (ref 20–33)
CREAT SERPL-MCNC: 0.65 MG/DL (ref 0.5–1.4)
EOSINOPHIL # BLD AUTO: 0.06 K/UL (ref 0–0.51)
EOSINOPHIL NFR BLD: 1.1 % (ref 0–6.9)
ERYTHROCYTE [DISTWIDTH] IN BLOOD BY AUTOMATED COUNT: 48.4 FL (ref 35.9–50)
GFR SERPLBLD CREATININE-BSD FMLA CKD-EPI: 86 ML/MIN/1.73 M 2
GLOBULIN SER CALC-MCNC: 2.6 G/DL (ref 1.9–3.5)
GLUCOSE SERPL-MCNC: 112 MG/DL (ref 65–99)
HCT VFR BLD AUTO: 36.2 % (ref 37–47)
HGB BLD-MCNC: 11.6 G/DL (ref 12–16)
IMM GRANULOCYTES # BLD AUTO: 0.01 K/UL (ref 0–0.11)
IMM GRANULOCYTES NFR BLD AUTO: 0.2 % (ref 0–0.9)
LYMPHOCYTES # BLD AUTO: 1.6 K/UL (ref 1–4.8)
LYMPHOCYTES NFR BLD: 30.5 % (ref 22–41)
MCH RBC QN AUTO: 28.6 PG (ref 27–33)
MCHC RBC AUTO-ENTMCNC: 32 G/DL (ref 33.6–35)
MCV RBC AUTO: 89.4 FL (ref 81.4–97.8)
MONOCYTES # BLD AUTO: 0.61 K/UL (ref 0–0.85)
MONOCYTES NFR BLD AUTO: 11.6 % (ref 0–13.4)
NEUTROPHILS # BLD AUTO: 2.93 K/UL (ref 2–7.15)
NEUTROPHILS NFR BLD: 56 % (ref 44–72)
NRBC # BLD AUTO: 0 K/UL
NRBC BLD-RTO: 0 /100 WBC
PLATELET # BLD AUTO: 266 K/UL (ref 164–446)
PMV BLD AUTO: 10.5 FL (ref 9–12.9)
POTASSIUM SERPL-SCNC: 3.8 MMOL/L (ref 3.6–5.5)
PROT SERPL-MCNC: 6.2 G/DL (ref 6–8.2)
RBC # BLD AUTO: 4.05 M/UL (ref 4.2–5.4)
SODIUM SERPL-SCNC: 144 MMOL/L (ref 135–145)
WBC # BLD AUTO: 5.2 K/UL (ref 4.8–10.8)

## 2023-04-09 PROCEDURE — A9270 NON-COVERED ITEM OR SERVICE: HCPCS

## 2023-04-09 PROCEDURE — 700102 HCHG RX REV CODE 250 W/ 637 OVERRIDE(OP): Performed by: NURSE PRACTITIONER

## 2023-04-09 PROCEDURE — 80053 COMPREHEN METABOLIC PANEL: CPT

## 2023-04-09 PROCEDURE — 92523 SPEECH SOUND LANG COMPREHEN: CPT

## 2023-04-09 PROCEDURE — 700102 HCHG RX REV CODE 250 W/ 637 OVERRIDE(OP)

## 2023-04-09 PROCEDURE — A9270 NON-COVERED ITEM OR SERVICE: HCPCS | Performed by: SURGERY

## 2023-04-09 PROCEDURE — A9270 NON-COVERED ITEM OR SERVICE: HCPCS | Performed by: NURSE PRACTITIONER

## 2023-04-09 PROCEDURE — 770001 HCHG ROOM/CARE - MED/SURG/GYN PRIV*

## 2023-04-09 PROCEDURE — 99233 SBSQ HOSP IP/OBS HIGH 50: CPT | Performed by: NURSE PRACTITIONER

## 2023-04-09 PROCEDURE — 85025 COMPLETE CBC W/AUTO DIFF WBC: CPT

## 2023-04-09 PROCEDURE — 700102 HCHG RX REV CODE 250 W/ 637 OVERRIDE(OP): Performed by: SURGERY

## 2023-04-09 RX ORDER — TEMAZEPAM 7.5 MG/1
15 CAPSULE ORAL
Status: DISCONTINUED | OUTPATIENT
Start: 2023-04-09 | End: 2023-04-11 | Stop reason: HOSPADM

## 2023-04-09 RX ADMIN — GABAPENTIN 400 MG: 400 CAPSULE ORAL at 05:41

## 2023-04-09 RX ADMIN — LEVETIRACETAM 500 MG: 500 TABLET, FILM COATED ORAL at 05:41

## 2023-04-09 RX ADMIN — ACETAMINOPHEN 1000 MG: 500 TABLET, FILM COATED ORAL at 05:41

## 2023-04-09 RX ADMIN — OMEPRAZOLE 20 MG: 20 CAPSULE, DELAYED RELEASE ORAL at 05:41

## 2023-04-09 RX ADMIN — TEMAZEPAM 15 MG: 7.5 CAPSULE ORAL at 20:15

## 2023-04-09 RX ADMIN — GABAPENTIN 400 MG: 400 CAPSULE ORAL at 17:54

## 2023-04-09 RX ADMIN — DOCUSATE SODIUM 100 MG: 100 CAPSULE, LIQUID FILLED ORAL at 05:41

## 2023-04-09 RX ADMIN — PINDOLOL 10 MG: 5 TABLET ORAL at 20:22

## 2023-04-09 RX ADMIN — LEVETIRACETAM 500 MG: 500 TABLET, FILM COATED ORAL at 17:53

## 2023-04-09 RX ADMIN — ACETAMINOPHEN 1000 MG: 500 TABLET, FILM COATED ORAL at 17:53

## 2023-04-09 RX ADMIN — AMLODIPINE BESYLATE 5 MG: 10 TABLET ORAL at 05:41

## 2023-04-09 RX ADMIN — PAROXETINE HYDROCHLORIDE 40 MG: 20 TABLET, FILM COATED ORAL at 05:41

## 2023-04-09 RX ADMIN — ACETAMINOPHEN 1000 MG: 500 TABLET, FILM COATED ORAL at 13:02

## 2023-04-09 ASSESSMENT — COGNITIVE AND FUNCTIONAL STATUS - GENERAL
SUGGESTED CMS G CODE MODIFIER DAILY ACTIVITY: CJ
TURNING FROM BACK TO SIDE WHILE IN FLAT BAD: A LITTLE
WALKING IN HOSPITAL ROOM: A LITTLE
MOBILITY SCORE: 18
DAILY ACTIVITIY SCORE: 20
HELP NEEDED FOR BATHING: A LITTLE
DRESSING REGULAR UPPER BODY CLOTHING: A LITTLE
MOVING FROM LYING ON BACK TO SITTING ON SIDE OF FLAT BED: A LITTLE
STANDING UP FROM CHAIR USING ARMS: A LITTLE
TOILETING: A LITTLE
MOVING TO AND FROM BED TO CHAIR: A LITTLE
DRESSING REGULAR LOWER BODY CLOTHING: A LITTLE
CLIMB 3 TO 5 STEPS WITH RAILING: A LITTLE
SUGGESTED CMS G CODE MODIFIER MOBILITY: CK

## 2023-04-09 ASSESSMENT — ENCOUNTER SYMPTOMS
TINGLING: 0
VOMITING: 0
DOUBLE VISION: 0
ABDOMINAL PAIN: 0
BACK PAIN: 0
FOCAL WEAKNESS: 0
DIZZINESS: 0
CHILLS: 0
BLURRED VISION: 0
FEVER: 0
SPEECH CHANGE: 0
HEADACHES: 0
SHORTNESS OF BREATH: 0
NECK PAIN: 0
MYALGIAS: 0
SENSORY CHANGE: 0
NAUSEA: 0

## 2023-04-09 ASSESSMENT — PAIN DESCRIPTION - PAIN TYPE
TYPE: ACUTE PAIN

## 2023-04-09 ASSESSMENT — LIFESTYLE VARIABLES: SUBSTANCE_ABUSE: 0

## 2023-04-09 ASSESSMENT — FIBROSIS 4 INDEX: FIB4 SCORE: 1.92

## 2023-04-09 NOTE — PROGRESS NOTES
Report given to Lyn STAPLETON on neuroscience. Pt transported to S198-01 with transport. Belongings with patient at time of transport. Pt's daughter Wendie updated about new room assignment.

## 2023-04-09 NOTE — PROGRESS NOTES
Trauma / Surgical Daily Progress Note    Date of Service  4/9/2023    Chief Complaint  87 y.o. female admitted 4/8/2023 with a small SDH, skull fracture, and scalp laceration after a mechanical GLF.    Interval Events  Doing well, denies any pain, to changes to vision  AA corrected after platelets  Lives in [place], [place] and is vacationing here    - Regular diet  - PT/OT  - SLP for cog eval  - Transfer to Neurosciences  - Anticipate discharge home tomorrow    Review of Systems  Review of Systems   Constitutional:  Negative for chills, fever and malaise/fatigue.   HENT:  Negative for hearing loss.         Denies pain to posterior head   Eyes:  Negative for blurred vision and double vision.   Respiratory:  Negative for shortness of breath.    Cardiovascular:  Negative for chest pain.   Gastrointestinal:  Negative for abdominal pain, nausea and vomiting.   Genitourinary:  Negative for dysuria (voiding).   Musculoskeletal:  Negative for back pain, joint pain, myalgias and neck pain.   Skin:  Negative for rash.   Neurological:  Negative for dizziness, tingling, sensory change, speech change, focal weakness and headaches.   Psychiatric/Behavioral:  Negative for substance abuse.       Vital Signs  Temp:  [36.6 °C (97.9 °F)-37.8 °C (100 °F)] 37 °C (98.6 °F)  Pulse:  [54-80] 61  Resp:  [12-51] 16  BP: (118-177)/(56-88) 122/64  SpO2:  [88 %-97 %] 96 %    Physical Exam  Physical Exam  Vitals and nursing note reviewed.   Constitutional:       General: She is awake. She is not in acute distress.     Appearance: She is well-developed. She is not ill-appearing.   HENT:      Head: Normocephalic.      Comments: Posterior scalp laceration approximated with staples     Right Ear: External ear normal.      Left Ear: External ear normal.      Nose: Nose normal.      Mouth/Throat:      Mouth: Mucous membranes are moist.      Pharynx: Oropharynx is clear.      Comments: Ecchymosis and mild edema to bottom lip  Eyes:      Pupils: Pupils  are equal, round, and reactive to light.   Cardiovascular:      Rate and Rhythm: Normal rate and regular rhythm.      Pulses: Normal pulses.      Heart sounds: Normal heart sounds. No murmur heard.  Pulmonary:      Effort: Pulmonary effort is normal. No respiratory distress.      Breath sounds: Normal breath sounds. No stridor. No wheezing, rhonchi or rales.   Chest:      Chest wall: No tenderness.   Abdominal:      General: Bowel sounds are normal. There is no distension.      Palpations: Abdomen is soft.      Tenderness: There is no abdominal tenderness. There is no guarding.   Musculoskeletal:         General: No swelling, tenderness, deformity or signs of injury.      Cervical back: Normal range of motion and neck supple. No rigidity or tenderness.      Comments: Moves all extremities   Skin:     General: Skin is warm and dry.      Capillary Refill: Capillary refill takes less than 2 seconds.   Neurological:      Mental Status: She is alert.      GCS: GCS eye subscore is 4. GCS verbal subscore is 4. GCS motor subscore is 6.   Psychiatric:         Mood and Affect: Mood normal.         Behavior: Behavior normal. Behavior is cooperative.       Laboratory  Recent Results (from the past 24 hour(s))   PLATELETS REQUEST    Collection Time: 04/08/23 12:26 PM   Result Value Ref Range    Component P       P07                 Plts,Pheresis       [id number]   transfused   04/08/23   15:46      Product Type Platelets  Pheresis LR     Dispense Status transfused     Unit Number (Barcoded) [id number]     Product Code (Barcoded) S9563X48     Blood Type (Barcoded) 7300    ABO Rh Confirm    Collection Time: 04/08/23 12:40 PM   Result Value Ref Range    ABO Rh Confirm O POS    PLATELET MAPPING WITH BASIC TEG    Collection Time: 04/08/23  6:32 PM   Result Value Ref Range    Reaction Time Initial-R 5.9 4.6 - 9.1 min    React Time Initial Hep 5.6 4.3 - 8.3 min    Clot Kinetics-K 1.1 0.8 - 2.1 min    Clot Angle-Angle 74.5 63.0 -  78.0 degrees    Maximum Clot Strength-MA 63.9 52.0 - 69.0 mm    TEG Functional Fibrinogen(MA) 24.4 15.0 - 32.0 mm    Lysis 30 minutes-LY30 1.0 0.0 - 2.6 %    % Inhibition ADP 15.7 0.0 - 17.0 %    % Inhibition AA 20.4 (H) 0.0 - 11.0 %    TEG Algorithm Link Algorithm    CBC with Differential: Tomorrow AM    Collection Time: 04/09/23  3:15 AM   Result Value Ref Range    WBC 5.2 4.8 - 10.8 K/uL    RBC 4.05 (L) 4.20 - 5.40 M/uL    Hemoglobin 11.6 (L) 12.0 - 16.0 g/dL    Hematocrit 36.2 (L) 37.0 - 47.0 %    MCV 89.4 81.4 - 97.8 fL    MCH 28.6 27.0 - 33.0 pg    MCHC 32.0 (L) 33.6 - 35.0 g/dL    RDW 48.4 35.9 - 50.0 fL    Platelet Count 266 164 - 446 K/uL    MPV 10.5 9.0 - 12.9 fL    Neutrophils-Polys 56.00 44.00 - 72.00 %    Lymphocytes 30.50 22.00 - 41.00 %    Monocytes 11.60 0.00 - 13.40 %    Eosinophils 1.10 0.00 - 6.90 %    Basophils 0.60 0.00 - 1.80 %    Immature Granulocytes 0.20 0.00 - 0.90 %    Nucleated RBC 0.00 /100 WBC    Neutrophils (Absolute) 2.93 2.00 - 7.15 K/uL    Lymphs (Absolute) 1.60 1.00 - 4.80 K/uL    Monos (Absolute) 0.61 0.00 - 0.85 K/uL    Eos (Absolute) 0.06 0.00 - 0.51 K/uL    Baso (Absolute) 0.03 0.00 - 0.12 K/uL    Immature Granulocytes (abs) 0.01 0.00 - 0.11 K/uL    NRBC (Absolute) 0.00 K/uL   Comp Metabolic Panel (CMP): Tomorrow AM    Collection Time: 04/09/23  3:15 AM   Result Value Ref Range    Sodium 144 135 - 145 mmol/L    Potassium 3.8 3.6 - 5.5 mmol/L    Chloride 111 96 - 112 mmol/L    Co2 22 20 - 33 mmol/L    Anion Gap 11.0 7.0 - 16.0    Glucose 112 (H) 65 - 99 mg/dL    Bun 10 8 - 22 mg/dL    Creatinine 0.65 0.50 - 1.40 mg/dL    Calcium 8.5 8.5 - 10.5 mg/dL    AST(SGOT) 18 12 - 45 U/L    ALT(SGPT) 9 2 - 50 U/L    Alkaline Phosphatase 103 (H) 30 - 99 U/L    Total Bilirubin 0.5 0.1 - 1.5 mg/dL    Albumin 3.6 3.2 - 4.9 g/dL    Total Protein 6.2 6.0 - 8.2 g/dL    Globulin 2.6 1.9 - 3.5 g/dL    A-G Ratio 1.4 g/dL   CORRECTED CALCIUM    Collection Time: 04/09/23  3:15 AM   Result Value Ref  Range    Correct Calcium 8.8 8.5 - 10.5 mg/dL   ESTIMATED GFR    Collection Time: 04/09/23  3:15 AM   Result Value Ref Range    GFR (CKD-EPI) 86 >60 mL/min/1.73 m 2       Fluids    Intake/Output Summary (Last 24 hours) at 4/9/2023 1154  Last data filed at 4/9/2023 1000  Gross per 24 hour   Intake 1235 ml   Output 1800 ml   Net -565 ml       Core Measures & Quality Metrics  Labs reviewed, Medications reviewed and Radiology images reviewed  Arceo catheter: No Arceo      DVT Prophylaxis: Contraindicated - High bleeding risk  DVT prophylaxis - mechanical: SCDs  Ulcer prophylaxis: Yes      RAP Score Total: 9  CAGE Results: negative Blood Alcohol>0.08: no     Assessment/Plan  * Trauma- (present on admission)  Assessment & Plan  Mechanical GLF. GCS 15. On ASA.  Trauma Green Transfer Activation from [facility] in [place], [place].  Pilo James MD. Trauma Surgery.    SDH (subdural hematoma) (HCC)- (present on admission)  Assessment & Plan  Acute subdural hemorrhage along the anterior right frontal lobe, 5mm.  mBIG3.  Non-operative management.  Post traumatic pharmacologic seizure prophylaxis for 1 week.  Speech Language Pathology cognitive evaluation.  Bakari Erazo MD. Neurosurgeon. [facility].    Open fracture of right side of occipital bone (HCC)- (present on admission)  Assessment & Plan  Nondepressed right occipital skull fracture.  Non-operative management.  Bakari Erazo MD. Neurosurgeon. [facility].    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  Admission AA inhibition 86.2%, transfused 1 unit platelet pheresis.  Repeat TEG - 22%.      Laceration of head, initial encounter- (present on admission)  Assessment & Plan  Posterior head laceration. Closed with staples prior to arrival.  Tetanus given in ED.  Staple removal in 7 post repair (4/15).    Closed fracture of tooth- (present on  admission)  Assessment & Plan  Right front tooth chipped.  Follow up with local dentist as an outpatient.    Insomnia- (present on admission)  Assessment & Plan  Chronic condition treated with temazepam.  Holding maintenance medication during acute traumatic illness.    Depression- (present on admission)  Assessment & Plan  Chronic condition treated with paroxetine.  Resumed maintenance medication on admission.    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  No baseline deficits.  Holding maintenance medication during acute traumatic illness.    Primary hypertension- (present on admission)  Assessment & Plan  Chronic condition treated with amlodipine and pindolol.  Resumed maintenance medication on admission.    Osteoarthritis- (present on admission)  Assessment & Plan  Chronic condition treated with gabapentin and Norco.  Resumed gabapentin on admission.  Multimodal pain control.    Contraindication to deep vein thrombosis (DVT) prophylaxis- (present on admission)  Assessment & Plan  Prophylactic anticoagulation for thrombotic prevention initially contraindicated secondary to elevated bleeding risk.  4/10 Trauma surveillance venous duplex scanning ordered.   Ambulate TID.    Mental status adequate for full examination?: Yes    Spine cleared (radiologically and/or clinically): Yes    All current laboratory studies/radiology exams reviewed: Yes    Medications reconciliation has been reviewed: Yes    Completed Consultations:  Dr. Erazo, neurosurgery     Pending Consultations:  NA    Newly Identified Diagnoses and Injuries:  None    Discussed patient condition with RN, RT, Pharmacy, Patient, and trauma surgery. Dr. ELISSA Mcmullen

## 2023-04-09 NOTE — THERAPY
"Speech Language Pathology   Cognitive Evaluation     Patient Name: Yelena Barba  AGE:  87 y.o., SEX:  female  Medical Record #: [id number]  Date of Service: 4/9/2023      History of Present Illness  86 YO female admitted 4/8 2/2 fall.     PMHx: depression, HTN, insomnia, subdural hematoma, TIA. No SLP hx at [facility].     CMHx: SDH, laceration of head, hx of embolic stroke, trauma, platelet dysfx, depression, osteoarthritis, primary HTN, insomnia, open fx of R side occipital bone.     Head CT 4/8 \"1.  Interval increase in size of RIGHT frontal subdural hematoma now crossing the midline into the LEFT inferior frontal region measuring up to 8 mm thickness and into RIGHT anterior temporal region where it measures 7 mm in thickness.  2.  No significant midline shift.  3.  Nondepressed RIGHT occipital skull fracture.\"      General Information  Vitals  O2 (LPM): (P) 0  O2 Delivery Device: (P) None - Room Air  Level of Consciousness: (P) Alert, Awake  Orientation: (P) Oriented x 4     Prior Living Situation & Level of Function  Prior Services: (P) Intermittent Physical Support for ADL Per Family  Housing / Facility: (P) 1 Story House  Lives with - Patient's Self Care Capacity: (P) Adult Children  Communication: (P) WFL     Oral Mechanism Evaluation  Facial Symmetry: (P) Equal  Motor Speech: (P) WFL      Laryngeal Function Exam  Voice Quality: (P) WFL      Subjective   Pt alert, followed directions and participated fully.       Communication Domain(s)  Expressive Language: (P) WFL  Receptive Language: (P) WFL  Cognitive-Linguistic: (P) Mild  Reading: (P) WFL  Social/Pragmatic: (P) WFL      Assessment  The patient was seen this date for a cognitive-linguistic evaluation 2/2 frontal SDH. Pt reports living in a one story house with her adult children. Pt does not drive, uses a pillbox to manage her medications with the assistance of her children, her children also assist with her financial management. Pt reports her adult " "children cook for her, do her laundry, and \"don't let me help with anything where I have to bend over\". Pt endorses some memory loss over the past few years, states she's been having trouble with writing \"since her last fall\".       Cognistat  Orientation: Average  Attention: Average  Comprehension: Average  Repetition:  Average  Naming:  Average  Memory:  Mild  Calculations: Mild  Similarities: Average  Judgement: Average    Pt recalled 2/4 verbally presented words after 3 minute delay, pt able to recall 1/2 with logical cue and remaining word with multiple choice cue.     Further assessment revealed WNL writing at the name level and functional reading. Pt required mild-mod logical cues to complete medication management task. During clock drawing task pt able to draw [place], write all 12 numbers, and accurately place hour hand, but demonstrated difficulty with placement of minute hand despite mod-max logical cues. Of note, pt able to read a clock without difficulty. Pt able to complete simple math problems, but demonstrated difficulty with multi-step calculations, which can be indicative of a working memory deficit.      Clinical Impressions  Pt presenting with a mild cognitive-linguistic deficit, characterized by difficulty with calculations, working memory, high level visual planning and immediate memory. Suspect deficits are baseline given advanced age, level of support she receives from her adult children at baseline, and pt report of baseline function. SLP is not actively following for cognitive-linguistic intervention.       NOTE: It is not within the scope of practice of Speech-Language Pathologists to determine patient capacity. Please defer to the physician or psych to complete this assessment.     Recommendations  Supervision Needs Upons Discharge: Intermittent assistance with IADLs (see below)  IADLs: Medication management, Financial management, pt agreeable to attempting these IADLs with the supervision " of her adult children.        SLP Treatment Plan  Treatment Plan: None Indicated  SLP Frequency: N/A - Evaluation Only  Estimated Duration: N/A - Evaluation Only      Anticipated Discharge Needs  Discharge Recommendations: Anticipate that the patient will have no further speech therapy needs after discharge from the hospital  Therapy Recommendations Upon DC:  Not Indicated      Jonh Reese, SLP

## 2023-04-09 NOTE — PROGRESS NOTES
4 Eyes Skin Assessment Completed by DAYA Nicholson and DAYA Leiva.    Head Incision  Ears WDL  Nose WDL  Mouth WDL  Neck WDL  Breast/Chest WDL  Shoulder Blades Redness  Spine Redness  (R) Arm/Elbow/Hand Redness  (L) Arm/Elbow/Hand Redness  Abdomen WDL  Groin WDL  Scrotum/Coccyx/Buttocks Redness and Blanching  (R) Leg Bruising  (L) Leg Bruising  (R) Heel/Foot/Toe WDL  (L) Heel/Foot/Toe WDL          Devices In Places Pulse Ox      Interventions In Place Sacral Mepilex    Possible Skin Injury No    Pictures Uploaded Into Epic N/A  Wound Consult Placed N/A  RN Wound Prevention Protocol Ordered No

## 2023-04-09 NOTE — ASSESSMENT & PLAN NOTE
Nondepressed right occipital skull fracture.  Non-operative management.  Bakari Erazo MD. Neurosurgeon. [facility].

## 2023-04-09 NOTE — CARE PLAN
Problem: Knowledge Deficit - Standard  Goal: Patient and family/care givers will demonstrate understanding of plan of care, disease process/condition, diagnostic tests and medications  Outcome: Progressing     Problem: Pain - Standard  Goal: Alleviation of pain or a reduction in pain to the patient’s comfort goal  Outcome: Progressing   The patient is Stable - Low risk of patient condition declining or worsening    Shift Goals  Clinical Goals: stable neuro exams, possible transfer  Patient Goals: discharge home  Family Goals: updates

## 2023-04-09 NOTE — CONSULTS
DATE OF SERVICE:  04/08/2023     CHIEF COMPLAINT:  Fall with closed head injury.     HISTORY OF PRESENT ILLNESS:  This is an 87-year-old woman who was walking in a   dark room when she tripped and fell and hit the back of her head.  She did   not lose consciousness but was evaluated at [facility].  She had   an occipital laceration and a CT examination showing a right frontal subdural   hematoma.  This was minimal in size and not compressive on the frontal lobe.     PAST MEDICAL HISTORY:  Remarkable for depression, hypertension, insomnia, TIA.     PAST SURGICAL HISTORY:  Denied.     ALLERGIES:  None known.     CURRENT MEDICATIONS:  She cannot remember but she is on Norvasc, aspirin,   Neurontin, p.r.n. Norco, Paxil, Visken, Seroquel and Restoril.     FAMILY HISTORY:  Not obtained.     REVIEW OF SYSTEMS:  Negative for recent chest pain, chest pressure.  Negative   for recent shortness of breath.  Negative for recent surgical intervention.     PHYSICAL EXAMINATION:  GENERAL:  Well-developed, well-nourished woman.  No acute distress.  HEENT:  Laceration to the back of her head.  Dentition, occlusion adequate.  NECK:  Supple.  CARDIOVASCULAR:  Heart has regular rate and rhythm.  ABDOMEN:  Soft, no masses or tenderness appreciated.  NEUROLOGIC:  Alert and oriented x4.  Cranial nerves are intact.  Speech is   fluent.  Motor strength is 5/5.  [place] coma score of 15.     DIAGNOSTIC STUDIES:  CT examination of the head shows acute subdural   hemorrhage along the anterior right frontal lobe, 5 mm.  She is on aspirin.     IMPRESSION AND PLAN:  Traumatic subdural hematoma, without mass effect,   midline shift or significant compression of the brain.  This can be treated   with time, and holding her aspirin.  She has a laceration to the head, which   has been treated.  She has a contraindication to anticoagulants.  She needs to   be followed, but I do not think she needs to be admitted to the intensive   care  unit.     Total time spent with examination of the patient, review of films, discussion   with the nursing staff 20 minutes.        ______________________________  MD SANTOS TIM/NEELIMA/CAROLEE    DD:  04/08/2023 14:19  DT:  04/08/2023 18:13    Job#:  [id number]

## 2023-04-09 NOTE — CARE PLAN
The patient is Stable - Low risk of patient condition declining or worsening    Shift Goals  Clinical Goals: stable neuro exams  Patient Goals: Rest  Family Goals: VADIM    Progress made toward(s) clinical / shift goals:      Patient is not progressing towards the following goals:      Problem: Knowledge Deficit - Standard  Goal: Patient and family/care givers will demonstrate understanding of plan of care, disease process/condition, diagnostic tests and medications  Outcome: Progressing     Problem: Fall Risk  Goal: Patient will remain free from falls  Outcome: Progressing

## 2023-04-10 ENCOUNTER — APPOINTMENT (OUTPATIENT)
Dept: RADIOLOGY | Facility: MEDICAL CENTER | Age: 88
DRG: 087 | End: 2023-04-10
Payer: MEDICARE

## 2023-04-10 LAB
ANION GAP SERPL CALC-SCNC: 12 MMOL/L (ref 7–16)
BASOPHILS # BLD AUTO: 0.5 % (ref 0–1.8)
BASOPHILS # BLD: 0.04 K/UL (ref 0–0.12)
BUN SERPL-MCNC: 11 MG/DL (ref 8–22)
CALCIUM SERPL-MCNC: 8.3 MG/DL (ref 8.5–10.5)
CHLORIDE SERPL-SCNC: 105 MMOL/L (ref 96–112)
CO2 SERPL-SCNC: 23 MMOL/L (ref 20–33)
CREAT SERPL-MCNC: 0.61 MG/DL (ref 0.5–1.4)
EOSINOPHIL # BLD AUTO: 0.07 K/UL (ref 0–0.51)
EOSINOPHIL NFR BLD: 0.8 % (ref 0–6.9)
ERYTHROCYTE [DISTWIDTH] IN BLOOD BY AUTOMATED COUNT: 47.4 FL (ref 35.9–50)
GFR SERPLBLD CREATININE-BSD FMLA CKD-EPI: 87 ML/MIN/1.73 M 2
GLUCOSE SERPL-MCNC: 130 MG/DL (ref 65–99)
HCT VFR BLD AUTO: 33.6 % (ref 37–47)
HGB BLD-MCNC: 11.1 G/DL (ref 12–16)
IMM GRANULOCYTES # BLD AUTO: 0.04 K/UL (ref 0–0.11)
IMM GRANULOCYTES NFR BLD AUTO: 0.5 % (ref 0–0.9)
LYMPHOCYTES # BLD AUTO: 1.24 K/UL (ref 1–4.8)
LYMPHOCYTES NFR BLD: 15 % (ref 22–41)
MAGNESIUM SERPL-MCNC: 2 MG/DL (ref 1.5–2.5)
MCH RBC QN AUTO: 28.3 PG (ref 27–33)
MCHC RBC AUTO-ENTMCNC: 33 G/DL (ref 33.6–35)
MCV RBC AUTO: 85.7 FL (ref 81.4–97.8)
MONOCYTES # BLD AUTO: 0.7 K/UL (ref 0–0.85)
MONOCYTES NFR BLD AUTO: 8.4 % (ref 0–13.4)
NEUTROPHILS # BLD AUTO: 6.2 K/UL (ref 2–7.15)
NEUTROPHILS NFR BLD: 74.8 % (ref 44–72)
NRBC # BLD AUTO: 0 K/UL
NRBC BLD-RTO: 0 /100 WBC
PHOSPHATE SERPL-MCNC: 2.7 MG/DL (ref 2.5–4.5)
PLATELET # BLD AUTO: 261 K/UL (ref 164–446)
PMV BLD AUTO: 10.6 FL (ref 9–12.9)
POTASSIUM SERPL-SCNC: 3.5 MMOL/L (ref 3.6–5.5)
RBC # BLD AUTO: 3.92 M/UL (ref 4.2–5.4)
SODIUM SERPL-SCNC: 140 MMOL/L (ref 135–145)
WBC # BLD AUTO: 8.3 K/UL (ref 4.8–10.8)

## 2023-04-10 PROCEDURE — A9270 NON-COVERED ITEM OR SERVICE: HCPCS | Performed by: SURGERY

## 2023-04-10 PROCEDURE — 770001 HCHG ROOM/CARE - MED/SURG/GYN PRIV*

## 2023-04-10 PROCEDURE — 84100 ASSAY OF PHOSPHORUS: CPT

## 2023-04-10 PROCEDURE — 97166 OT EVAL MOD COMPLEX 45 MIN: CPT

## 2023-04-10 PROCEDURE — A9270 NON-COVERED ITEM OR SERVICE: HCPCS | Performed by: NURSE PRACTITIONER

## 2023-04-10 PROCEDURE — 80048 BASIC METABOLIC PNL TOTAL CA: CPT

## 2023-04-10 PROCEDURE — 700102 HCHG RX REV CODE 250 W/ 637 OVERRIDE(OP)

## 2023-04-10 PROCEDURE — 700111 HCHG RX REV CODE 636 W/ 250 OVERRIDE (IP): Performed by: SURGERY

## 2023-04-10 PROCEDURE — A9270 NON-COVERED ITEM OR SERVICE: HCPCS

## 2023-04-10 PROCEDURE — 99233 SBSQ HOSP IP/OBS HIGH 50: CPT

## 2023-04-10 PROCEDURE — 93970 EXTREMITY STUDY: CPT

## 2023-04-10 PROCEDURE — 83735 ASSAY OF MAGNESIUM: CPT

## 2023-04-10 PROCEDURE — 700102 HCHG RX REV CODE 250 W/ 637 OVERRIDE(OP): Performed by: NURSE PRACTITIONER

## 2023-04-10 PROCEDURE — 700102 HCHG RX REV CODE 250 W/ 637 OVERRIDE(OP): Performed by: SURGERY

## 2023-04-10 PROCEDURE — 97161 PT EVAL LOW COMPLEX 20 MIN: CPT

## 2023-04-10 PROCEDURE — 85025 COMPLETE CBC W/AUTO DIFF WBC: CPT

## 2023-04-10 RX ORDER — QUETIAPINE FUMARATE 25 MG/1
25 TABLET, FILM COATED ORAL NIGHTLY
Status: DISCONTINUED | OUTPATIENT
Start: 2023-04-10 | End: 2023-04-11 | Stop reason: HOSPADM

## 2023-04-10 RX ORDER — POTASSIUM CHLORIDE 20 MEQ/1
40 TABLET, EXTENDED RELEASE ORAL ONCE
Status: COMPLETED | OUTPATIENT
Start: 2023-04-10 | End: 2023-04-10

## 2023-04-10 RX ADMIN — GABAPENTIN 400 MG: 400 CAPSULE ORAL at 05:43

## 2023-04-10 RX ADMIN — PINDOLOL 10 MG: 5 TABLET ORAL at 16:52

## 2023-04-10 RX ADMIN — GABAPENTIN 400 MG: 400 CAPSULE ORAL at 16:52

## 2023-04-10 RX ADMIN — PAROXETINE HYDROCHLORIDE 40 MG: 20 TABLET, FILM COATED ORAL at 05:44

## 2023-04-10 RX ADMIN — AMLODIPINE BESYLATE 5 MG: 10 TABLET ORAL at 05:44

## 2023-04-10 RX ADMIN — LEVETIRACETAM 500 MG: 500 TABLET, FILM COATED ORAL at 05:43

## 2023-04-10 RX ADMIN — ACETAMINOPHEN 1000 MG: 500 TABLET, FILM COATED ORAL at 13:59

## 2023-04-10 RX ADMIN — POTASSIUM CHLORIDE 40 MEQ: 1500 TABLET, EXTENDED RELEASE ORAL at 08:24

## 2023-04-10 RX ADMIN — TEMAZEPAM 15 MG: 7.5 CAPSULE ORAL at 20:02

## 2023-04-10 RX ADMIN — ONDANSETRON 4 MG: 4 TABLET, ORALLY DISINTEGRATING ORAL at 06:41

## 2023-04-10 RX ADMIN — LEVETIRACETAM 500 MG: 500 TABLET, FILM COATED ORAL at 16:52

## 2023-04-10 RX ADMIN — ACETAMINOPHEN 1000 MG: 500 TABLET, FILM COATED ORAL at 16:52

## 2023-04-10 RX ADMIN — ACETAMINOPHEN 1000 MG: 500 TABLET, FILM COATED ORAL at 05:44

## 2023-04-10 RX ADMIN — PINDOLOL 10 MG: 5 TABLET ORAL at 05:42

## 2023-04-10 RX ADMIN — QUETIAPINE FUMARATE 25 MG: 25 TABLET ORAL at 20:02

## 2023-04-10 RX ADMIN — OMEPRAZOLE 20 MG: 20 CAPSULE, DELAYED RELEASE ORAL at 05:43

## 2023-04-10 ASSESSMENT — COGNITIVE AND FUNCTIONAL STATUS - GENERAL
CLIMB 3 TO 5 STEPS WITH RAILING: A LITTLE
DRESSING REGULAR UPPER BODY CLOTHING: A LITTLE
STANDING UP FROM CHAIR USING ARMS: A LITTLE
TOILETING: A LITTLE
HELP NEEDED FOR BATHING: A LITTLE
DAILY ACTIVITIY SCORE: 20
WALKING IN HOSPITAL ROOM: A LITTLE
MOVING TO AND FROM BED TO CHAIR: A LITTLE
MOBILITY SCORE: 18
TURNING FROM BACK TO SIDE WHILE IN FLAT BAD: A LITTLE
SUGGESTED CMS G CODE MODIFIER MOBILITY: CK
MOVING FROM LYING ON BACK TO SITTING ON SIDE OF FLAT BED: A LITTLE
DRESSING REGULAR LOWER BODY CLOTHING: A LITTLE
SUGGESTED CMS G CODE MODIFIER DAILY ACTIVITY: CJ

## 2023-04-10 ASSESSMENT — ENCOUNTER SYMPTOMS
FEVER: 0
ABDOMINAL PAIN: 0
NERVOUS/ANXIOUS: 0
DOUBLE VISION: 0
NECK PAIN: 0
VOMITING: 1
COUGH: 0
NAUSEA: 0
FOCAL WEAKNESS: 0
BLURRED VISION: 0
DIZZINESS: 1
SHORTNESS OF BREATH: 0
SORE THROAT: 0
HEADACHES: 1
CHILLS: 0
PALPITATIONS: 0
DEPRESSION: 0

## 2023-04-10 ASSESSMENT — GAIT ASSESSMENTS
GAIT LEVEL OF ASSIST: SUPERVISED
DISTANCE (FEET): 300
ASSISTIVE DEVICE: FRONT WHEEL WALKER

## 2023-04-10 ASSESSMENT — ACTIVITIES OF DAILY LIVING (ADL): TOILETING: INDEPENDENT

## 2023-04-10 NOTE — DIETARY
"Nutrition services: Day 2 of admit.  Yelena Barba is a 87 y.o. female with admitting DX of Trauma.     Consult received for BMI <19 and unintentional weight loss of 2-13 lb in >1 year (MST 2). RD met with pt and pt's family at bedside. Pt reports a good appetite and intake. Family notes she is not a big eater at baseline. Hx of part of stomach removed d/t ulcer. Eats small frequent meals. RD to add 6 meal modifier and yogurt and ice cream with meals per pt request. Pt notes that Boost supplements upset her stomach and she does not want them at this time. Per pt she previously lost weight the last time she was admitted d/t pureed food texture. Pt now up weight and stable ~103 lbs. No further nutrition questions at this time.       Assessment:  Height: 157.5 cm (5' 2\")  Weight: 46.8 kg (103 lb 2.8 oz)  Body mass index is 18.87 kg/m²., BMI classification: normal but low  Diet/Intake: Regular, variable intake 0-100%. Averaging ~50%.     Evaluation:   Pt with BMI <19 and reported weight loss per nutrition admit screen. Per pt and pt's family pt up weight over the last few months, no weight loss concerns at this time. Pt thin appearing however no overt signs of muscle or fat wasting noted.   Current clinical picture and MD progress notes reviewed. Pt admitted for small SDH, skull fracture and scalp laceration s/p GLF.   Labs (4/10) K+ 3.5 (L), Glu 130 (H), Ca 8.3 (L)  Meds: colace, gabapentin, Kdur,   Skin: no   +BM 4/10    Malnutrition Risk: Pt does not meet criteria per ASPEN guidelines     Recommendations/Plan:  Regular diet as tolerated, addition of 6 small meals to better mimic pt's home regime   Addition of Yogurt and Ice cream per pt preference.    Encourage intake of all meals   Document intake of all meals as % taken in ADL's to provide interdisciplinary communication across all shifts.   Monitor weight.  Nutrition rep will continue to see patient for ongoing meal and snack preferences.     RD following "

## 2023-04-10 NOTE — CARE PLAN
Problem: Knowledge Deficit - Standard  Goal: Patient and family/care givers will demonstrate understanding of plan of care, disease process/condition, diagnostic tests and medications  Outcome: Progressing     Problem: Fall Risk  Goal: Patient will remain free from falls  Outcome: Progressing     Problem: Pain - Standard  Goal: Alleviation of pain or a reduction in pain to the patient’s comfort goal  Outcome: Progressing   The patient is Stable - Low risk of patient condition declining or worsening    Shift Goals  Clinical Goals: stable neuro status  Patient Goals: go home  Family Goals: go home    Progress made toward(s) clinical / shift goals:  understands poc. Denies pain.    Patient is not progressing towards the following goals:

## 2023-04-10 NOTE — CARE PLAN
The patient is Stable - Low risk of patient condition declining or worsening    Shift Goals  Clinical Goals: stable neuros, safety  Patient Goals: sleep, DC  Family Goals: updates    Progress made toward(s) clinical / shift goals:      Problem: Fall Risk  Goal: Patient will remain free from falls  Outcome: Progressing   Pt remained free from falls this shift.     Problem: Pain - Standard  Goal: Alleviation of pain or a reduction in pain to the patient’s comfort goal  Outcome: Progressing   No c/o pain this shift.

## 2023-04-10 NOTE — PROGRESS NOTES
Neurosurgery Progress Note    Subjective:  No acute events over night  +Sinus HA  Nausea and vomited once today, dizziness with ambulation    Exam:  A/Ox4  CHILDS 5/5  No focal deficits    BP  Min: 102/62  Max: 175/60  Pulse  Av.3  Min: 61  Max: 79  Resp  Av.5  Min: 16  Max: 45  Temp  Av.8 °C (98.2 °F)  Min: 36.4 °C (97.5 °F)  Max: 37.1 °C (98.8 °F)  SpO2  Av.1 %  Min: 94 %  Max: 96 %    No data recorded    Recent Labs     23  1028 23  0315 04/10/23  0359   WBC 8.5 5.2 8.3   RBC 4.28 4.05* 3.92*   HEMOGLOBIN 12.1 11.6* 11.1*   HEMATOCRIT 37.9 36.2* 33.6*   MCV 88.6 89.4 85.7   MCH 28.3 28.6 28.3   MCHC 31.9* 32.0* 33.0*   RDW 48.2 48.4 47.4   PLATELETCT 242 266 261   MPV 10.9 10.5 10.6     Recent Labs     23  1028 23  0315 04/10/23  0359   SODIUM 143 144 140   POTASSIUM 4.5 3.8 3.5*   CHLORIDE 109 111 105   CO2  22 23   GLUCOSE 100* 112* 130*   BUN 14 10 11   CREATININE 0.77 0.65 0.61   CALCIUM 8.4* 8.5 8.3*     Recent Labs     23  1028   APTT 29.2   INR 1.06     Recent Labs     23  1028 23  1832   REACTMIN 5.3 5.9   CLOTKINET 1.0 1.1   CLOTANGL 76.9 74.5   MAXCLOTS 63.1 63.9   [id number] 0.7 1.0   PRCINADP 16.4 15.7   PRCINAA 86.2* 20.4*       Intake/Output                         23 07 - 04/10/23 0659 04/10/23 0700 - -0659 Total 8454-2979 2025-5404 Total                 Intake    P.O.  1020  240 1260  --  -- --    P.O. 9655 473 3649 -- -- --    Total Intake 9298 606 9586 -- -- --       Output    Urine  --  -- --  --  -- --    Number of Times Voided 2 x -- 2 x -- -- --    Emesis  --  2 2  --  -- --    Emesis -- 2 2 -- -- --    Stool  --  -- --  --  -- --    Number of Times Stooled 2 x -- 2 x -- -- --    Total Output -- 2 2 -- -- --       Net I/O     9980 719 3329 -- -- --              Intake/Output Summary (Last 24 hours) at 4/10/2023 1022  Last data filed at 4/10/2023 0635  Gross per 24 hour   Intake 720 ml   Output  2 ml   Net 718 ml             temazepam  15 mg QHS    amLODIPine  5 mg DAILY    gabapentin  400 mg BID    pindolol  10 mg BID    Respiratory Therapy Consult   Continuous RT    Pharmacy Consult Request  1 Each PHARMACY TO DOSE    ondansetron  4 mg Q4HRS PRN    docusate sodium  100 mg BID    senna-docusate  1 Tablet Nightly    senna-docusate  1 Tablet Q24HRS PRN    polyethylene glycol/lytes  1 Packet BID    magnesium hydroxide  30 mL DAILY    bisacodyl  10 mg Q24HRS PRN    sodium phosphate  1 Each Once PRN    acetaminophen  1,000 mg Q6HRS    Followed by    [START ON 4/13/2023] acetaminophen  1,000 mg Q6HRS PRN    hydrALAZINE  10 mg Q4HRS PRN    levETIRAcetam  500 mg Q12HRS    Or    levETIRAcetam (Keppra) IV  500 mg Q12HRS    PARoxetine  40 mg QAM    HYDROcodone-acetaminophen  1 Tablet Q6HRS PRN    omeprazole  20 mg DAILY       Assessment and Plan:  Hospital day #2  Neuro stable, but has dizziness with ambulation and some N/V  No ASA or NSAIDs   Discussed with Dr. Erazo  Keep her one more day in the hospital for observation

## 2023-04-10 NOTE — PROGRESS NOTES
Trauma / Surgical Daily Progress Note    Date of Service  4/10/2023    Chief Complaint  87 y.o. female admitted 4/8/2023 with a small SDH, skull fracture, and scalp laceration after a mechanical GLF.    Interval Events  Transferred to neuro [place] yesterday, no events overnight.   + HA, emesis x 1.   No DC recommendations by PT/OT.   Plan to DC home tomorrow.     Review of Systems  Review of Systems   Constitutional:  Negative for chills and fever.   HENT:  Negative for sore throat.    Eyes:  Negative for blurred vision and double vision.   Respiratory:  Negative for cough and shortness of breath.    Cardiovascular:  Negative for chest pain, palpitations and leg swelling.   Gastrointestinal:  Positive for vomiting. Negative for abdominal pain and nausea.   Genitourinary:  Negative for dysuria and urgency.   Musculoskeletal:  Negative for joint pain and neck pain.   Neurological:  Positive for dizziness and headaches. Negative for focal weakness.   Psychiatric/Behavioral:  Negative for depression. The patient is not nervous/anxious.       Vital Signs  Temp:  [36.4 °C (97.5 °F)-37.1 °C (98.8 °F)] 37.1 °C (98.8 °F)  Pulse:  [61-79] 76  Resp:  [16-45] 18  BP: (129-175)/(59-79) 140/63  SpO2:  [94 %-96 %] 96 %    Physical Exam  Physical Exam  Vitals and nursing note reviewed.   Constitutional:       General: She is not in acute distress.     Appearance: Normal appearance. She is normal weight. She is not ill-appearing.   HENT:      Head: Normocephalic.      Nose: Nose normal.      Mouth/Throat:      Mouth: Mucous membranes are moist.      Comments: Chipped right front tooth  Eyes:      Conjunctiva/sclera: Conjunctivae normal.      Pupils: Pupils are equal, round, and reactive to light.   Cardiovascular:      Rate and Rhythm: Normal rate and regular rhythm.      Pulses: Normal pulses.      Heart sounds: Normal heart sounds. No murmur heard.  Pulmonary:      Effort: Pulmonary effort is normal. No respiratory distress.       Breath sounds: Normal breath sounds.   Abdominal:      General: Abdomen is flat. Bowel sounds are normal. There is no distension.      Palpations: Abdomen is soft.      Tenderness: There is no abdominal tenderness.   Musculoskeletal:      Cervical back: Normal range of motion and neck supple.      Right lower leg: No edema.      Left lower leg: No edema.   Skin:     General: Skin is warm and dry.      Capillary Refill: Capillary refill takes less than 2 seconds.      Coloration: Skin is not jaundiced.   Neurological:      General: No focal deficit present.      Mental Status: She is alert and oriented to person, place, and time.   Psychiatric:         Mood and Affect: Mood normal.         Behavior: Behavior normal.       Laboratory  Recent Results (from the past 24 hour(s))   CBC with Differential: Tomorrow AM    Collection Time: 04/10/23  3:59 AM   Result Value Ref Range    WBC 8.3 4.8 - 10.8 K/uL    RBC 3.92 (L) 4.20 - 5.40 M/uL    Hemoglobin 11.1 (L) 12.0 - 16.0 g/dL    Hematocrit 33.6 (L) 37.0 - 47.0 %    MCV 85.7 81.4 - 97.8 fL    MCH 28.3 27.0 - 33.0 pg    MCHC 33.0 (L) 33.6 - 35.0 g/dL    RDW 47.4 35.9 - 50.0 fL    Platelet Count 261 164 - 446 K/uL    MPV 10.6 9.0 - 12.9 fL    Neutrophils-Polys 74.80 (H) 44.00 - 72.00 %    Lymphocytes 15.00 (L) 22.00 - 41.00 %    Monocytes 8.40 0.00 - 13.40 %    Eosinophils 0.80 0.00 - 6.90 %    Basophils 0.50 0.00 - 1.80 %    Immature Granulocytes 0.50 0.00 - 0.90 %    Nucleated RBC 0.00 /100 WBC    Neutrophils (Absolute) 6.20 2.00 - 7.15 K/uL    Lymphs (Absolute) 1.24 1.00 - 4.80 K/uL    Monos (Absolute) 0.70 0.00 - 0.85 K/uL    Eos (Absolute) 0.07 0.00 - 0.51 K/uL    Baso (Absolute) 0.04 0.00 - 0.12 K/uL    Immature Granulocytes (abs) 0.04 0.00 - 0.11 K/uL    NRBC (Absolute) 0.00 K/uL   Magnesium: Every Monday and Thursday AM    Collection Time: 04/10/23  3:59 AM   Result Value Ref Range    Magnesium 2.0 1.5 - 2.5 mg/dL   Phosphorus: Every Monday and Thursday AM     Collection Time: 04/10/23  3:59 AM   Result Value Ref Range    Phosphorus 2.7 2.5 - 4.5 mg/dL   Basic Metabolic Panel    Collection Time: 04/10/23  3:59 AM   Result Value Ref Range    Sodium 140 135 - 145 mmol/L    Potassium 3.5 (L) 3.6 - 5.5 mmol/L    Chloride 105 96 - 112 mmol/L    Co2 23 20 - 33 mmol/L    Glucose 130 (H) 65 - 99 mg/dL    Bun 11 8 - 22 mg/dL    Creatinine 0.61 0.50 - 1.40 mg/dL    Calcium 8.3 (L) 8.5 - 10.5 mg/dL    Anion Gap 12.0 7.0 - 16.0   ESTIMATED GFR    Collection Time: 04/10/23  3:59 AM   Result Value Ref Range    GFR (CKD-EPI) 87 >60 mL/min/1.73 m 2       Fluids    Intake/Output Summary (Last 24 hours) at 4/10/2023 1217  Last data filed at 4/10/2023 0635  Gross per 24 hour   Intake 480 ml   Output 2 ml   Net 478 ml       Core Measures & Quality Metrics  Labs reviewed, Medications reviewed and Radiology images reviewed  Arceo catheter: No Arceo      DVT Prophylaxis: Contraindicated - High bleeding risk  DVT prophylaxis - mechanical: SCDs  Ulcer prophylaxis: No    Assessed for rehab: Patient returned to prior level of function, rehabilitation not indicated at this time  RAP Score Total: 9  CAGE Results: negative Blood Alcohol>0.08: no     Assessment/Plan  * Trauma- (present on admission)  Assessment & Plan  Mechanical GLF. GCS 15. On ASA.  Trauma Green Transfer Activation from [facility] in [place], [place].  Pilo James MD. Trauma Surgery.    SDH (subdural hematoma) (HCC)- (present on admission)  Assessment & Plan  Acute subdural hemorrhage along the anterior right frontal lobe, 5mm.  mBIG3.  Non-operative management.  Post traumatic pharmacologic seizure prophylaxis for 1 week.  Speech Language Pathology cognitive evaluation.  Bakrai Erazo MD. Neurosurgeon. [facility].    Open fracture of right side of occipital bone (HCC)- (present on admission)  Assessment & Plan  Nondepressed right occipital skull fracture.  Non-operative management.  Bakari KENYON  MD Castro. Neurosurgeon. [facility].    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  Admission AA inhibition 86.2%, transfused 1 unit platelet pheresis.  Repeat TEG - 22%.    Laceration of head, initial encounter- (present on admission)  Assessment & Plan  Posterior head laceration. Closed with staples prior to arrival.  Tetanus given in ED.  Staple removal in 7 post repair (4/15).    Closed fracture of tooth- (present on admission)  Assessment & Plan  Right front tooth chipped.  Follow up with local dentist as an outpatient.    Insomnia- (present on admission)  Assessment & Plan  Chronic condition treated with temazepam.  Holding maintenance medication during acute traumatic illness.    Depression- (present on admission)  Assessment & Plan  Chronic condition treated with paroxetine.  Resumed maintenance medication on admission.    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  No baseline deficits.  Holding maintenance medication during acute traumatic illness.    Primary hypertension- (present on admission)  Assessment & Plan  Chronic condition treated with amlodipine and pindolol.  Resumed maintenance medication on admission.    Osteoarthritis- (present on admission)  Assessment & Plan  Chronic condition treated with gabapentin and Norco.  Resumed gabapentin on admission.  Multimodal pain control.    Contraindication to deep vein thrombosis (DVT) prophylaxis- (present on admission)  Assessment & Plan  Prophylactic anticoagulation for thrombotic prevention initially contraindicated secondary to elevated bleeding risk.  4/10 Trauma surveillance venous duplex scanning ordered.   Ambulate TID.      Discussed patient condition with Family, RN, Patient, and Dr James .

## 2023-04-10 NOTE — THERAPY
Physical Therapy   Initial Evaluation     Patient Name: Yelena Barba  Age:  87 y.o., Sex:  female  Medical Record #: [id number]  Today's Date: 4/10/2023    Assessment  Patient is an 87 y.o. female admitted following GLF, sustained SDH, skull fx, and scalp lac. Pt seen for PT evaluation at this time. Pt completed mobility without assist, ambulated with FWW, no LOB, reports no concerns with mobility and appears functionally capable of return home at this time. Reports adult children able to assist if needed at home. No further acute PT needs.     Plan  Evaluation only  DC Equipment Recommendations: None  Discharge Recommendations: Anticipate that the patient will have no further physical therapy needs after discharge from the hospital     04/10/23 0957   Prior Living Situation   Housing / Facility 1 Story House   Steps Into Home 1   Steps In Home 0   Bathroom Set up Bathtub / Shower Combination;Shower Chair;Grab Bars   Equipment Owned Front-Wheel Walker;4-Wheel Walker;Single Point Cane;Tub / Shower Seat   Lives with -  Adult Children   Comments reports someone is always home, works from home   Prior Level of Functional Mobility   Bed Mobility Independent   Transfer Status Independent   Ambulation Independent   Ambulation Distance community distances   Assistive Devices Used FWW or SPC in the home, 4WW in the community   Stairs Independent   Cognition    Level of Consciousness Alert   Comments pleasant and receptive to edu, motivated to return home   Balance Assessment   Sitting Balance (Static) Good   Sitting Balance (Dynamic) Fair +   Standing Balance (Static) Fair +   Standing Balance (Dynamic) Fair   Weight Shift Sitting Good   Weight Shift Standing Fair   Comments FWW with amb in the halls, no AD in the room   Bed Mobility    Comments up pre/post, no concerns   Gait Analysis   Gait Level Of Assist Supervised   Assistive Device Front Wheel Walker   Distance (Feet) 300   Weight Bearing Status no restrictions    Comments no LOB   Functional Mobility   Sit to Stand Supervised   Bed, Chair, Wheelchair Transfer Supervised

## 2023-04-11 VITALS
TEMPERATURE: 97 F | HEART RATE: 62 BPM | BODY MASS INDEX: 18.99 KG/M2 | OXYGEN SATURATION: 95 % | DIASTOLIC BLOOD PRESSURE: 65 MMHG | HEIGHT: 62 IN | RESPIRATION RATE: 16 BRPM | WEIGHT: 103.17 LBS | SYSTOLIC BLOOD PRESSURE: 137 MMHG

## 2023-04-11 LAB
ANION GAP SERPL CALC-SCNC: 11 MMOL/L (ref 7–16)
BASOPHILS # BLD AUTO: 0.5 % (ref 0–1.8)
BASOPHILS # BLD: 0.03 K/UL (ref 0–0.12)
BUN SERPL-MCNC: 10 MG/DL (ref 8–22)
CALCIUM SERPL-MCNC: 8.1 MG/DL (ref 8.5–10.5)
CHLORIDE SERPL-SCNC: 113 MMOL/L (ref 96–112)
CO2 SERPL-SCNC: 22 MMOL/L (ref 20–33)
CREAT SERPL-MCNC: 0.58 MG/DL (ref 0.5–1.4)
EOSINOPHIL # BLD AUTO: 0.1 K/UL (ref 0–0.51)
EOSINOPHIL NFR BLD: 1.6 % (ref 0–6.9)
ERYTHROCYTE [DISTWIDTH] IN BLOOD BY AUTOMATED COUNT: 48.2 FL (ref 35.9–50)
GFR SERPLBLD CREATININE-BSD FMLA CKD-EPI: 88 ML/MIN/1.73 M 2
GLUCOSE SERPL-MCNC: 113 MG/DL (ref 65–99)
HCT VFR BLD AUTO: 30.3 % (ref 37–47)
HGB BLD-MCNC: 9.9 G/DL (ref 12–16)
IMM GRANULOCYTES # BLD AUTO: 0.01 K/UL (ref 0–0.11)
IMM GRANULOCYTES NFR BLD AUTO: 0.2 % (ref 0–0.9)
LYMPHOCYTES # BLD AUTO: 1.97 K/UL (ref 1–4.8)
LYMPHOCYTES NFR BLD: 30.8 % (ref 22–41)
MCH RBC QN AUTO: 28.3 PG (ref 27–33)
MCHC RBC AUTO-ENTMCNC: 32.7 G/DL (ref 33.6–35)
MCV RBC AUTO: 86.6 FL (ref 81.4–97.8)
MONOCYTES # BLD AUTO: 0.62 K/UL (ref 0–0.85)
MONOCYTES NFR BLD AUTO: 9.7 % (ref 0–13.4)
NEUTROPHILS # BLD AUTO: 3.67 K/UL (ref 2–7.15)
NEUTROPHILS NFR BLD: 57.2 % (ref 44–72)
NRBC # BLD AUTO: 0 K/UL
NRBC BLD-RTO: 0 /100 WBC
PLATELET # BLD AUTO: 235 K/UL (ref 164–446)
PMV BLD AUTO: 11 FL (ref 9–12.9)
POTASSIUM SERPL-SCNC: 3.8 MMOL/L (ref 3.6–5.5)
RBC # BLD AUTO: 3.5 M/UL (ref 4.2–5.4)
SODIUM SERPL-SCNC: 146 MMOL/L (ref 135–145)
WBC # BLD AUTO: 6.4 K/UL (ref 4.8–10.8)

## 2023-04-11 PROCEDURE — 700111 HCHG RX REV CODE 636 W/ 250 OVERRIDE (IP): Performed by: SURGERY

## 2023-04-11 PROCEDURE — A9270 NON-COVERED ITEM OR SERVICE: HCPCS

## 2023-04-11 PROCEDURE — 85025 COMPLETE CBC W/AUTO DIFF WBC: CPT

## 2023-04-11 PROCEDURE — 700102 HCHG RX REV CODE 250 W/ 637 OVERRIDE(OP): Performed by: SURGERY

## 2023-04-11 PROCEDURE — A9270 NON-COVERED ITEM OR SERVICE: HCPCS | Performed by: SURGERY

## 2023-04-11 PROCEDURE — 700102 HCHG RX REV CODE 250 W/ 637 OVERRIDE(OP)

## 2023-04-11 PROCEDURE — 80048 BASIC METABOLIC PNL TOTAL CA: CPT

## 2023-04-11 RX ORDER — LEVETIRACETAM 500 MG/1
500 TABLET ORAL 2 TIMES DAILY
Qty: 7 TABLET | Refills: 0 | Status: SHIPPED | OUTPATIENT
Start: 2023-04-11 | End: 2023-04-15

## 2023-04-11 RX ADMIN — PINDOLOL 10 MG: 5 TABLET ORAL at 05:15

## 2023-04-11 RX ADMIN — LEVETIRACETAM 500 MG: 500 TABLET, FILM COATED ORAL at 05:17

## 2023-04-11 RX ADMIN — OMEPRAZOLE 20 MG: 20 CAPSULE, DELAYED RELEASE ORAL at 05:16

## 2023-04-11 RX ADMIN — ACETAMINOPHEN 1000 MG: 500 TABLET, FILM COATED ORAL at 01:10

## 2023-04-11 RX ADMIN — GABAPENTIN 400 MG: 400 CAPSULE ORAL at 05:15

## 2023-04-11 RX ADMIN — ACETAMINOPHEN 1000 MG: 500 TABLET, FILM COATED ORAL at 05:16

## 2023-04-11 RX ADMIN — ONDANSETRON 4 MG: 4 TABLET, ORALLY DISINTEGRATING ORAL at 05:24

## 2023-04-11 RX ADMIN — PAROXETINE HYDROCHLORIDE 40 MG: 20 TABLET, FILM COATED ORAL at 05:17

## 2023-04-11 RX ADMIN — AMLODIPINE BESYLATE 5 MG: 10 TABLET ORAL at 05:16

## 2023-04-11 NOTE — DISCHARGE INSTRUCTIONS
- Call or seek medical attention for questions or concerns  - Follow up with primary care provider within one weeks time. Review your home medication with your primary care doctor.    - Resume regular diet  - May take over the counter acetaminophen as needed for pain. Avoid NSAIDS, Aspirin, Ibuprofen if you have been advise to do so by your Neurosurgeon. Do not resume daily aspirin until told okay to resume by your neurosurgeon.   - May use OTC 4% lidocaine patches if unable to fill Lidoderm patches  - Continue daily over the counter stool softener while on narcotics  - No operation of machinery or motorized vehicles while under the influence of narcotics  - No alcohol, marijuana or illicit drug use while under the influence of narcotics  - In the event of a narcotic overdose naloxone (Narcan) is available without a prescription from any [facility] or [facility]  - Hospital staff are unable to refill your pain medication. You will need to contact your PCP or surgeon's office for refills  - No swimming, hot tubs, baths or wound submersion until cleared by outpatient provider. May shower  - No contact sports, strenuous activities, or heavy lifting until cleared by outpatient provider  - If respiratory decompensation, persistent or worsening pain, fever or signs or symptoms of infection occur seek medical attention

## 2023-04-11 NOTE — CARE PLAN
The patient is Stable - Low risk of patient condition declining or worsening    Shift Goals  Clinical Goals: stable neuros  Patient Goals: DC  Family Goals: go home    Progress made toward(s) clinical / shift goals:       Problem: Knowledge Deficit - Standard  Goal: Patient and family/care givers will demonstrate understanding of plan of care, disease process/condition, diagnostic tests and medications  4/11/2023 0518 by Trena Garcia, R.N.  Outcome: Progressing       Problem: Fall Risk  Goal: Patient will remain free from falls  4/11/2023 0518 by Trena Garcia, R.N.  Outcome: Progressing    Problem: Pain - Standard  Goal: Alleviation of pain or a reduction in pain to the patient’s comfort goal  4/11/2023 0518 by Trena Garcia, R.N.  Outcome: Progressing

## 2023-04-11 NOTE — PROGRESS NOTES
Neurosurgery Progress Note    Subjective:  No acute events over night  Mild +Sinus pressure which she states she does not have at this time. No h/a or nausea & dizziness.      Exam:  A/Ox4  CHILDS 5/  No focal deficits    BP  Min: 127/53  Max: 143/63  Pulse  Av  Min: 60  Max: 67  Resp  Av.5  Min: 16  Max: 17  Temp  Av.6 °C (97.9 °F)  Min: 36.1 °C (97 °F)  Max: 37.1 °C (98.8 °F)  SpO2  Av.8 %  Min: 94 %  Max: 95 %    No data recorded    Recent Labs     04/09/23  0315 04/10/23  0359 04/11/23  0046   WBC 5.2 8.3 6.4   RBC 4.05* 3.92* 3.50*   HEMOGLOBIN 11.6* 11.1* 9.9*   HEMATOCRIT 36.2* 33.6* 30.3*   MCV 89.4 85.7 86.6   MCH 28.6 28.3 28.3   MCHC 32.0* 33.0* 32.7*   RDW 48.4 47.4 48.2   PLATELETCT 266 261 235   MPV 10.5 10.6 11.0       Recent Labs     04/09/23  0315 04/10/23  0359 04/11/23  0046   SODIUM 144 140 146*   POTASSIUM 3.8 3.5* 3.8   CHLORIDE 111 105 113*   CO2 22 23 22   GLUCOSE 112* 130* 113*   BUN 10 11 10   CREATININE 0.65 0.61 0.58   CALCIUM 8.5 8.3* 8.1*       Recent Labs     23  1028   APTT 29.2   INR 1.06       Recent Labs     23  1028 23  1832   REACTMIN 5.3 5.9   CLOTKINET 1.0 1.1   CLOTANGL 76.9 74.5   MAXCLOTS 63.1 63.9   [id number] 0.7 1.0   PRCINADP 16.4 15.7   PRCINAA 86.2* 20.4*         Intake/Output                         04/10/23 0700 - 23 0659 23 07 - 2359     1900-0659 Total 4558-2420 4754-6425 Total                 Intake    P.O.  --  200 200  120  -- 120    P.O. -- 200 200 120 -- 120    Total Intake -- 200 200 120 -- 120       Output    Urine  --  -- --  --  -- --    Number of Times Voided 2 x 2 x 4 x 1 x -- 1 x    Stool  --  -- --  --  -- --    Number of Times Stooled 3 x 1 x 4 x -- -- --    Total Output -- -- -- -- -- --       Net I/O     -- 200 200 120 -- 120              Intake/Output Summary (Last 24 hours) at 2023 0928  Last data filed at 2023 0809  Gross per 24 hour   Intake 320 ml   Output --   Net 320  ml               QUEtiapine  25 mg Nightly    temazepam  15 mg QHS    amLODIPine  5 mg DAILY    gabapentin  400 mg BID    pindolol  10 mg BID    Respiratory Therapy Consult   Continuous RT    Pharmacy Consult Request  1 Each PHARMACY TO DOSE    ondansetron  4 mg Q4HRS PRN    docusate sodium  100 mg BID    senna-docusate  1 Tablet Nightly    senna-docusate  1 Tablet Q24HRS PRN    polyethylene glycol/lytes  1 Packet BID    magnesium hydroxide  30 mL DAILY    bisacodyl  10 mg Q24HRS PRN    sodium phosphate  1 Each Once PRN    acetaminophen  1,000 mg Q6HRS    Followed by    [START ON 4/13/2023] acetaminophen  1,000 mg Q6HRS PRN    hydrALAZINE  10 mg Q4HRS PRN    levETIRAcetam  500 mg Q12HRS    Or    levETIRAcetam (Keppra) IV  500 mg Q12HRS    PARoxetine  40 mg QAM    HYDROcodone-acetaminophen  1 Tablet Q6HRS PRN    omeprazole  20 mg DAILY       Assessment and Plan:  Hospital day #3  Neuro stable  No ASA or NSAIDs   Pt clear to dc home in [place]., follow up with her PCP

## 2023-04-11 NOTE — DISCHARGE SUMMARY
Trauma Discharge Summary    DATE OF ADMISSION: 4/8/2023    DATE OF DISCHARGE: 4/11/2023    LENGTH OF STAY: 4    ATTENDING PHYSICIAN: Pilo James M.D.    CONSULTING PHYSICIAN:   1.  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 *    PROCEDURES:  1. None    HISTORY OF PRESENT ILLNESS: The patient is a 87 y.o. female who was reportedly injured in a ground level fall. She was transferred from [facility] to [facility] in [place], [place] for subdural hematoma.    HOSPITAL COURSE: The patient was triaged as a trauma green transfer activation. The patient was taking aspirin and had 80% platelet inhibition which improved with platelet transfusion. Neurosurgery, Dr Erazo was consulted who recommended conservative management and holding aspirin. She was admitted to the ICU and started on prophylactic Keppra. Her recovery was uncomplicated and transferred to the neuroscience unit 4/9. She was evaluated and cleared for discharge home by PT and OT. SLP recommended close supervision upon discharge.     She denies nausea, vomiting, headache, weakness, or dizziness. She will be discharged home to [place] under the close care of her daughter, Wendie. A prescription for the remaining course of Keppra has been sent to her preferred pharmacy. She has been instructed to follow with her PCP for staple removal and neurosurgery consult. She has  been educated that she should not resume taking aspirin until cleared by neurosurgery.     HOSPITAL PROBLEM LIST:  * Trauma- (present on admission)  Assessment & Plan  Mechanical GLF. GCS 15. On ASA.  Trauma Green Transfer Activation from [facility] in [place], [place].  Pilo James MD. Trauma Surgery.    SDH (subdural hematoma) (HCC)- (present on admission)  Assessment & Plan  Acute subdural hemorrhage along the anterior right frontal lobe, 5mm.  mBIG3.  Non-operative management.  Post traumatic pharmacologic seizure prophylaxis for 1 week.  Speech Language Pathology cognitive evaluation.  Bakari Erazo MD. Neurosurgeon. [facility].    Open fracture of right side of occipital bone (HCC)- (present on admission)  Assessment & Plan  Nondepressed right occipital skull fracture.  Non-operative management.  Bakari Erazo MD. Neurosurgeon. [facility].    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  Admission AA inhibition 86.2%, transfused 1 unit platelet pheresis.  Repeat TEG - 22%.    Laceration of head, initial encounter- (present on admission)  Assessment & Plan  Posterior head laceration. Closed with staples prior to arrival.  Tetanus given in ED.  Staple removal in 7 post repair (4/15).    Closed fracture of tooth- (present on admission)  Assessment & Plan  Right front tooth chipped.  Follow up with local dentist as an outpatient.    Insomnia- (present on admission)  Assessment & Plan  Chronic condition treated with temazepam.  Holding maintenance medication during acute traumatic illness.  4/9 Resumed restoril.    Depression- (present on admission)  Assessment & Plan  Chronic condition treated with paroxetine.  Resumed maintenance medication on admission.    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  No baseline  deficits.  Holding maintenance medication during acute traumatic illness.    Primary hypertension- (present on admission)  Assessment & Plan  Chronic condition treated with amlodipine and pindolol.  Resumed maintenance medication on admission.    Osteoarthritis- (present on admission)  Assessment & Plan  Chronic condition treated with gabapentin and Norco.  Resumed gabapentin on admission.  Multimodal pain control.    Contraindication to deep vein thrombosis (DVT) prophylaxis- (present on admission)  Assessment & Plan  Prophylactic anticoagulation for thrombotic prevention initially contraindicated secondary to elevated bleeding risk.  4/10 Trauma surveillance venous duplex scanning ordered.   Ambulate TID.      DISPOSITION: Discharged home on 4/11/2023. The patient was and family were counseled and questions were answered. Specifically, signs and symptoms of infection, respiratory decompensation, neurological changes, and persistent or worsening pain were discussed and the patient agrees to seek medical attention if any of these develop.    DISCHARGE MEDICATIONS:  The patients controlled substance history was reviewed and a controlled substance use informed consent (if applicable) was provided by [facility] and the patient has been prescribed.     Medication List        CONTINUE taking these medications        Instructions   amLODIPine 5 MG Tabs  Commonly known as: NORVASC   Take 5 mg by mouth every day.  Dose: 5 mg     gabapentin 400 MG Caps  Commonly known as: NEURONTIN   Take 400 mg by mouth 2 times a day.  Dose: 400 mg     HYDROcodone/acetaminophen  MG Tabs  Commonly known as: NORCO   Take 1 Tablet by mouth every 6 hours as needed.  Dose: 1 Tablet     metoclopramide 10 MG Tabs  Commonly known as: REGLAN   Take 10 mg by mouth 2 times a day.  Dose: 10 mg     pantoprazole 40 MG Tbec  Commonly known as: PROTONIX   Take 40 mg by mouth every day.  Dose: 40 mg     paroxetine 40 MG  tablet  Commonly known as: PAXIL   Take 40 mg by mouth every morning.  Dose: 40 mg     pindolol 10 MG Tabs  Commonly known as: VISKIN   Take 10 mg by mouth 2 times a day.  Dose: 10 mg     temazepam 15 MG Caps  Commonly known as: Restoril   Take 15 mg by mouth at bedtime.  Dose: 15 mg              ACTIVITY:  As tolerated.    WOUND CARE:  Staples to be removed 4/15.    DIET:  Orders Placed This Encounter   Procedures    Diet Order Diet: Regular; Miscellaneous modifications: (optional): 6 Small Meals     Standing Status:   Standing     Number of Occurrences:   1     Order Specific Question:   Diet:     Answer:   Regular [1]     Order Specific Question:   Miscellaneous modifications: (optional)     Answer:   6 Small Meals [4]       FOLLOW UP:  Primary Care    Call in 1 week(s)  Make an appointment with your primary care provider.   Staples can be removed in 7-10 days.    Bakari Erazo M.D.  [address] [facility] [address]  [phone]    Call  As needed      TIME SPENT ON DISCHARGE: 35 minutes      ____________________________________________  Linsey M Wegener, A.P.R.N.    DD: 4/11/2023 10:44 AM

## 2023-04-19 NOTE — DOCUMENTATION QUERY
"                                                                         Formerly [facility], later [facility]                                                                       Query Response Note      PATIENT:               ANN-MARIE KMA  ACCT #:                  [id number]  MRN:                     [id number]  :                      1935  ADMIT DATE:       2023 10:15 AM  DISCH DATE:        2023 12:23 PM  RESPONDING  PROVIDER #:        [id number]           QUERY TEXT:    Per history and physical, the patient was admitted with an acute subdural hemorrhage. Per the neurosurgery PN dated , \"current film just completed shows bilateral frontal sdh/sah\".     Would you please clarify the diagnosis of subarachnoid hemorrhage (SAH) after study?    The patient's Clinical Indicators include:   CT Head: \"RIGHT frontal subdural hematoma now crossing the midline into the LEFT inferior frontal region measuring up to 8 mm thickness and into RIGHT anterior temporal region where it measures 7 mm in thickness\"    H&P: \"Acute subdural hemorrhage\"   Neurosurgery: \"Current film just completed shows bilateral frontal sdh/sah\"    Thank You,  Sadia Quiroz RN  Clinical    Connect via Equ[employer] or [email or web address]  Options provided:   -- Subarachnoid Hemorrhage Ruled In   -- Subarachnoid Hemorrhage Ruled Out   -- Other explanation, (please specify other explanation)   -- Unable to determine      Query created by: Sadia Quiroz on 4/10/2023 11:51 AM    RESPONSE TEXT:    Subarachnoid Hemorrhage Ruled In          Electronically signed by:  MASON KRUEGER MD 2023 11:38 AM              "
